# Patient Record
Sex: MALE | Race: BLACK OR AFRICAN AMERICAN | Employment: PART TIME | URBAN - METROPOLITAN AREA
[De-identification: names, ages, dates, MRNs, and addresses within clinical notes are randomized per-mention and may not be internally consistent; named-entity substitution may affect disease eponyms.]

---

## 2019-03-14 ENCOUNTER — HOSPITAL ENCOUNTER (EMERGENCY)
Age: 54
Discharge: HOME | End: 2019-03-14
Attending: EMERGENCY MEDICINE
Payer: SELF-PAY

## 2019-03-14 VITALS
HEIGHT: 69 IN | RESPIRATION RATE: 17 BRPM | OXYGEN SATURATION: 99 % | BODY MASS INDEX: 36.21 KG/M2 | TEMPERATURE: 98.3 F | WEIGHT: 244.5 LBS | DIASTOLIC BLOOD PRESSURE: 62 MMHG | SYSTOLIC BLOOD PRESSURE: 137 MMHG | HEART RATE: 80 BPM

## 2019-03-14 DIAGNOSIS — L72.0 RUPTURED EPIDERMAL CYST: Primary | ICD-10-CM

## 2019-03-14 PROCEDURE — 99283 EMERGENCY DEPT VISIT LOW MDM: CPT

## 2019-03-14 RX ORDER — CEPHALEXIN 500 MG/1
500 CAPSULE ORAL 4 TIMES DAILY
Qty: 40 CAP | Refills: 0 | Status: ON HOLD | OUTPATIENT
Start: 2019-03-14 | End: 2019-03-18 | Stop reason: CLARIF

## 2019-03-14 NOTE — ED PROVIDER NOTES
EMERGENCY DEPARTMENT HISTORY AND PHYSICAL EXAM    Date: 3/14/2019  Patient Name: Henry Larose    History of Presenting Illness     Chief Complaint   Patient presents with    Skin Problem     pt c/o boil on mid chest x 2 wks,its started draining today. History Provided By: Patient      HPI: Henry Larose is a 46 y.o. male with a PMH of seizure and cutaneous cyst who presents with mild nonpainful rash to chest wall 1 week with scant bleeding today. No modifying factors or medications. Patient specifically denies pain, purulent drainage, fever, chills, headache, nausea, vomiting, trouble breathing. PCP: None    Current Outpatient Medications   Medication Sig Dispense Refill    cephALEXin (KEFLEX) 500 mg capsule Take 1 Cap by mouth four (4) times daily for 10 days. 40 Cap 0    codeine-guaiFENesin (ROBITUSSIN-AC)  mg/5 mL syrup Take 10 mL by mouth four (4) times daily as needed for Cough. 180 mL 0       Past History     Past Medical History:  Past Medical History:   Diagnosis Date    Seizures ([address]     seizure as a child       Past Surgical History:  Past Surgical History:   Procedure Laterality Date    HX OTHER SURGICAL      cyst removed from right shoulder       Family History:  No family history on file. Social History:  Social History     Tobacco Use    Smoking status: Never Smoker   Substance Use Topics    Alcohol use: No    Drug use: No       Allergies:  No Known Allergies      Review of Systems   Review of Systems   Constitutional: Negative for activity change, chills, diaphoresis and fever. HENT: Negative for ear discharge, facial swelling, nosebleeds, postnasal drip, rhinorrhea, trouble swallowing and voice change. Eyes: Negative for photophobia, pain and visual disturbance. Respiratory: Negative for apnea, cough and shortness of breath. Cardiovascular: Negative for chest pain and palpitations.    Gastrointestinal: Negative for abdominal pain, diarrhea, nausea and vomiting. Genitourinary: Negative for decreased urine volume, difficulty urinating and hematuria. Musculoskeletal: Negative for arthralgias, back pain, gait problem, joint swelling, neck pain and neck stiffness. Skin: Positive for rash. Negative for color change, pallor and wound. Neurological: Negative for dizziness, facial asymmetry, speech difficulty, weakness, numbness and headaches. Psychiatric/Behavioral: Negative. Physical Exam     Vitals:    03/14/19 1003   BP: 137/62   Pulse: 80   Resp: 17   Temp: 98.3 °F (36.8 °C)   SpO2: 99%   Weight: 110.9 kg (244 lb 8 oz)   Height: 5' 9\" (1.753 m)     Physical Exam   Constitutional: He is oriented to person, place, and time. He appears well-developed and well-nourished. No distress. HENT:   Head: Normocephalic and atraumatic. Right Ear: Hearing and external ear normal.   Left Ear: Hearing and external ear normal.   Nose: Nose normal.   Eyes: Conjunctivae and EOM are normal. Pupils are equal, round, and reactive to light. Neck: Normal range of motion. Cardiovascular: Normal rate, regular rhythm, normal heart sounds and intact distal pulses. Pulmonary/Chest: Effort normal and breath sounds normal. No accessory muscle usage. No respiratory distress. He has no wheezes. He has no rales. He exhibits no tenderness. 2.5 cm diameter swollen nodule to mid chest wall with scant bloody drainage on manipulation. No purulent fluid. No fluctuance, induration, streaking. Musculoskeletal: Normal range of motion. Neurological: He is alert and oriented to person, place, and time. Skin: Skin is warm, dry and intact. He is not diaphoretic. No pallor. Psychiatric: He has a normal mood and affect. His speech is normal and behavior is normal. Judgment and thought content normal.   Nursing note and vitals reviewed. Diagnostic Study Results     Labs -   No results found for this or any previous visit (from the past 12 hour(s)).     Radiologic Studies -   No orders to display     CT Results  (Last 48 hours)    None        CXR Results  (Last 48 hours)    None            Medical Decision Making   I am the first provider for this patient. I reviewed the vital signs, available nursing notes, past medical history, past surgical history, family history and social history. Vital Signs-Reviewed the patient's vital signs. Records Reviewed: Nursing Notes and Old Medical Records            Disposition:    DISCHARGE NOTE:   .nwo      Care plan outlined and precautions discussed. Patient has no new complaints, changes, or physical findings. Results of exam were reviewed with the patient. All medications were reviewed with the patient; will d/c home with keflex. All of pt's questions and concerns were addressed. Patient was instructed and agrees to follow up with PCP, as well as to return to the ED upon further deterioration. Patient is ready to go home. Follow-up Information     Follow up With Specialties Details Why [facility] EMERGENCY DEPT Emergency Medicine Go in 2 days If symptoms worsen [address] [facility]          Current Discharge Medication List      START taking these medications    Details   cephALEXin (KEFLEX) 500 mg capsule Take 1 Cap by mouth four (4) times daily for 10 days. Qty: 40 Cap, Refills: 0             Provider Notes (Medical Decision Making):   Patient presents with a 3 cm nodule with scant bleeding today. No fluctuance, streaking, induration, purulent fluid. Plan to clean wound in the ED and discharged home on antibiotics. Differential of abscess, cellulitis, cyst    Procedures:  Procedures        Diagnosis     Clinical Impression:   1.  Ruptured epidermal cyst

## 2019-03-14 NOTE — ED NOTES
....Discharge summary and discharge medications reviewed with patient and appropriate educational materials and side effects teaching were provided. patient  Given 1 paper prescriptions and 0 electronic prescriptions sent to pt's listed pharmacy. Patient (s) verbalized understanding of the importance of discussing medications with his or her physician or clinic they will be following up with. No si/s of acute distress prior to discharge. Patient offered wheelchair from treatment area to hospital entrance, patient refused wheelchair. Denied pain. Gauze drsg with clear tape placed per provider request. Steady gait.

## 2019-03-14 NOTE — ED NOTES

## 2019-03-14 NOTE — DISCHARGE INSTRUCTIONS
Patient Education        Epidermoid Cyst: Care Instructions  Your Care Instructions  An epidermoid (say \"vd-gvo-DWH-bella\") cyst is a lump just under the skin. These cysts can form when a hair follicle becomes blocked. They are common in acne and may occur on the face, neck, back, and genitals. However, they can form anywhere on the body. These cysts are not cancer and do not lead to cancer. They tend not to hurt, but they can sometimes become swollen and painful. They also may break open (rupture) and cause scarring. These cysts sometimes do not cause problems and may not need treatment. If you have a cyst that is swollen and hurts, your doctor may inject it with a medicine to help it heal. But it is more likely that a painful cyst will need to be removed. Your doctor will give you a shot of numbing medicine and cut into the cyst to drain it or remove it. This makes the symptoms go away. Follow-up care is a key part of your treatment and safety. Be sure to make and go to all appointments, and call your doctor if you are having problems. It's also a good idea to know your test results and keep a list of the medicines you take. How can you care for yourself at home? · Do not squeeze the cyst or poke it with a needle to open it. This can cause swelling, redness, and infection. · Always have a doctor look at any new lumps you get to make sure that they are not serious. When should you call for help? Watch closely for changes in your health, and be sure to contact your doctor if:    · You have a fever, redness, or swelling after you get a shot of medicine in the cyst.     · You see or feel a new lump on your skin. Where can you learn more? Go to http://yung-tricia.info/. Enter T600 in the search box to learn more about \"Epidermoid Cyst: Care Instructions. \"  Current as of: April 17, 2018  Content Version: 11.9  © 3806-5859 [employer].  Care instructions adapted under license by [employer] (which disclaims liability or warranty for this information). If you have questions about a medical condition or this instruction, always ask your healthcare professional. [address] any warranty or liability for your use of this information.

## 2019-03-16 ENCOUNTER — HOSPITAL ENCOUNTER (INPATIENT)
Age: 54
LOS: 3 days | Discharge: HOME | DRG: 312 | End: 2019-03-19
Attending: EMERGENCY MEDICINE | Admitting: INTERNAL MEDICINE
Payer: SELF-PAY

## 2019-03-16 ENCOUNTER — APPOINTMENT (OUTPATIENT)
Dept: CT IMAGING | Age: 54
DRG: 312 | End: 2019-03-16
Attending: NURSE PRACTITIONER
Payer: SELF-PAY

## 2019-03-16 DIAGNOSIS — R55 SYNCOPE, UNSPECIFIED SYNCOPE TYPE: Primary | ICD-10-CM

## 2019-03-16 LAB
ALBUMIN SERPL-MCNC: 3.6 G/DL (ref 3.5–5)
ALBUMIN/GLOB SERPL: 0.9 {RATIO} (ref 1.1–2.2)
ALP SERPL-CCNC: 98 U/L (ref 45–117)
ALT SERPL-CCNC: 24 U/L (ref 12–78)
AMPHET UR QL SCN: NEGATIVE
ANION GAP SERPL CALC-SCNC: 5 MMOL/L (ref 5–15)
AST SERPL-CCNC: 30 U/L (ref 15–37)
BARBITURATES UR QL SCN: NEGATIVE
BASOPHILS # BLD: 0 K/UL (ref 0–0.1)
BASOPHILS NFR BLD: 0 % (ref 0–1)
BENZODIAZ UR QL: NEGATIVE
BILIRUB SERPL-MCNC: 0.3 MG/DL (ref 0.2–1)
BUN SERPL-MCNC: 9 MG/DL (ref 6–20)
BUN/CREAT SERPL: 8 (ref 12–20)
CALCIUM SERPL-MCNC: 8.8 MG/DL (ref 8.5–10.1)
CANNABINOIDS UR QL SCN: NEGATIVE
CHLORIDE SERPL-SCNC: 103 MMOL/L (ref 97–108)
CK MB CFR SERPL CALC: ABNORMAL % (ref 0–2.5)
CK MB SERPL-MCNC: <1 NG/ML (ref 5–25)
CK SERPL-CCNC: 512 U/L (ref 39–308)
CO2 SERPL-SCNC: 29 MMOL/L (ref 21–32)
COCAINE UR QL SCN: NEGATIVE
CREAT SERPL-MCNC: 1.08 MG/DL (ref 0.7–1.3)
D DIMER PPP FEU-MCNC: 0.46 MG/L FEU (ref 0–0.65)
DIFFERENTIAL METHOD BLD: ABNORMAL
DRUG SCRN COMMENT,DRGCM: NORMAL
EOSINOPHIL # BLD: 0.3 K/UL (ref 0–0.4)
EOSINOPHIL NFR BLD: 5 % (ref 0–7)
ERYTHROCYTE [DISTWIDTH] IN BLOOD BY AUTOMATED COUNT: 13.2 % (ref 11.5–14.5)
GLOBULIN SER CALC-MCNC: 3.9 G/DL (ref 2–4)
GLUCOSE SERPL-MCNC: 94 MG/DL (ref 65–100)
HCT VFR BLD AUTO: 39.7 % (ref 36.6–50.3)
HGB BLD-MCNC: 11.9 G/DL (ref 12.1–17)
IMM GRANULOCYTES # BLD AUTO: 0 K/UL (ref 0–0.04)
IMM GRANULOCYTES NFR BLD AUTO: 0 % (ref 0–0.5)
LYMPHOCYTES # BLD: 0.9 K/UL (ref 0.8–3.5)
LYMPHOCYTES NFR BLD: 15 % (ref 12–49)
MCH RBC QN AUTO: 26.6 PG (ref 26–34)
MCHC RBC AUTO-ENTMCNC: 30 G/DL (ref 30–36.5)
MCV RBC AUTO: 88.6 FL (ref 80–99)
METHADONE UR QL: NEGATIVE
MONOCYTES # BLD: 0.8 K/UL (ref 0–1)
MONOCYTES NFR BLD: 13 % (ref 5–13)
NEUTS SEG # BLD: 4.1 K/UL (ref 1.8–8)
NEUTS SEG NFR BLD: 67 % (ref 32–75)
NRBC # BLD: 0 K/UL (ref 0–0.01)
NRBC BLD-RTO: 0 PER 100 WBC
OPIATES UR QL: NEGATIVE
PCP UR QL: NEGATIVE
PLATELET # BLD AUTO: 215 K/UL (ref 150–400)
PMV BLD AUTO: 10 FL (ref 8.9–12.9)
POTASSIUM SERPL-SCNC: 4.9 MMOL/L (ref 3.5–5.1)
PROT SERPL-MCNC: 7.5 G/DL (ref 6.4–8.2)
RBC # BLD AUTO: 4.48 M/UL (ref 4.1–5.7)
SODIUM SERPL-SCNC: 137 MMOL/L (ref 136–145)
TROPONIN I SERPL-MCNC: <0.05 NG/ML
WBC # BLD AUTO: 6.2 K/UL (ref 4.1–11.1)

## 2019-03-16 PROCEDURE — 85379 FIBRIN DEGRADATION QUANT: CPT

## 2019-03-16 PROCEDURE — 93005 ELECTROCARDIOGRAM TRACING: CPT

## 2019-03-16 PROCEDURE — 99285 EMERGENCY DEPT VISIT HI MDM: CPT

## 2019-03-16 PROCEDURE — 36415 COLL VENOUS BLD VENIPUNCTURE: CPT

## 2019-03-16 PROCEDURE — 70450 CT HEAD/BRAIN W/O DYE: CPT

## 2019-03-16 PROCEDURE — 87040 BLOOD CULTURE FOR BACTERIA: CPT

## 2019-03-16 PROCEDURE — 74011250637 HC RX REV CODE- 250/637: Performed by: INTERNAL MEDICINE

## 2019-03-16 PROCEDURE — 85025 COMPLETE CBC W/AUTO DIFF WBC: CPT

## 2019-03-16 PROCEDURE — 74011250636 HC RX REV CODE- 250/636: Performed by: INTERNAL MEDICINE

## 2019-03-16 PROCEDURE — 82550 ASSAY OF CK (CPK): CPT

## 2019-03-16 PROCEDURE — 65270000032 HC RM SEMIPRIVATE

## 2019-03-16 PROCEDURE — 80307 DRUG TEST PRSMV CHEM ANLYZR: CPT

## 2019-03-16 PROCEDURE — 84484 ASSAY OF TROPONIN QUANT: CPT

## 2019-03-16 PROCEDURE — 80053 COMPREHEN METABOLIC PANEL: CPT

## 2019-03-16 RX ORDER — SODIUM CHLORIDE 0.9 % (FLUSH) 0.9 %
5-40 SYRINGE (ML) INJECTION EVERY 8 HOURS
Status: DISCONTINUED | OUTPATIENT
Start: 2019-03-16 | End: 2019-03-19 | Stop reason: HOSPADM

## 2019-03-16 RX ORDER — SODIUM CHLORIDE 9 MG/ML
150 INJECTION, SOLUTION INTRAVENOUS CONTINUOUS
Status: DISCONTINUED | OUTPATIENT
Start: 2019-03-17 | End: 2019-03-19

## 2019-03-16 RX ORDER — CEPHALEXIN 250 MG/1
500 CAPSULE ORAL 3 TIMES DAILY
Status: DISCONTINUED | OUTPATIENT
Start: 2019-03-17 | End: 2019-03-17

## 2019-03-16 RX ORDER — SODIUM CHLORIDE 0.9 % (FLUSH) 0.9 %
5-40 SYRINGE (ML) INJECTION AS NEEDED
Status: DISCONTINUED | OUTPATIENT
Start: 2019-03-16 | End: 2019-03-19 | Stop reason: HOSPADM

## 2019-03-16 RX ORDER — HEPARIN SODIUM 5000 [USP'U]/ML
5000 INJECTION, SOLUTION INTRAVENOUS; SUBCUTANEOUS EVERY 8 HOURS
Status: DISCONTINUED | OUTPATIENT
Start: 2019-03-16 | End: 2019-03-16

## 2019-03-16 RX ORDER — ACETAMINOPHEN 325 MG/1
650 TABLET ORAL
Status: DISCONTINUED | OUTPATIENT
Start: 2019-03-16 | End: 2019-03-19 | Stop reason: HOSPADM

## 2019-03-16 RX ADMIN — SODIUM CHLORIDE 100 ML/HR: 900 INJECTION, SOLUTION INTRAVENOUS at 23:40

## 2019-03-16 RX ADMIN — Medication 10 ML: at 23:40

## 2019-03-16 RX ADMIN — ACETAMINOPHEN 650 MG: 325 TABLET, FILM COATED ORAL at 23:39

## 2019-03-16 NOTE — ED NOTES
Emergency Department Nursing Plan of Care       The Nursing Plan of Care is developed from the Nursing assessment and Emergency Department Attending provider initial evaluation. The plan of care may be reviewed in the ED Provider note.     The Plan of Care was developed with the following considerations:   Patient / Family readiness to learn indicated by:verbalized understanding  Persons(s) to be included in education: patient  Barriers to Learning/Limitations:No    Signed     King Bird RN    3/16/2019   4:53 PM

## 2019-03-16 NOTE — ED NOTES
Bedside and Verbal shift change report given to MADS Plants RN (oncoming nurse) by Jose Enrique Long (offgoing nurse). Report included the following information SBAR, ED Summary and Recent Results.

## 2019-03-17 PROBLEM — R74.8 ELEVATED CK: Status: ACTIVE | Noted: 2019-03-17

## 2019-03-17 PROBLEM — L02.213 CHEST WALL ABSCESS: Status: ACTIVE | Noted: 2019-03-17

## 2019-03-17 PROBLEM — J09.X2 INFLUENZA A (H5N1): Status: ACTIVE | Noted: 2019-03-17

## 2019-03-17 LAB
ANION GAP SERPL CALC-SCNC: 8 MMOL/L (ref 5–15)
ATRIAL RATE: 76 BPM
BUN SERPL-MCNC: 8 MG/DL (ref 6–20)
BUN/CREAT SERPL: 7 (ref 12–20)
CALCIUM SERPL-MCNC: 8.4 MG/DL (ref 8.5–10.1)
CALCULATED P AXIS, ECG09: 62 DEGREES
CALCULATED R AXIS, ECG10: 45 DEGREES
CALCULATED T AXIS, ECG11: 49 DEGREES
CHLORIDE SERPL-SCNC: 105 MMOL/L (ref 97–108)
CO2 SERPL-SCNC: 27 MMOL/L (ref 21–32)
CREAT SERPL-MCNC: 1.09 MG/DL (ref 0.7–1.3)
DIAGNOSIS, 93000: NORMAL
ERYTHROCYTE [DISTWIDTH] IN BLOOD BY AUTOMATED COUNT: 13.1 % (ref 11.5–14.5)
FLUAV AG NPH QL IA: POSITIVE
FLUBV AG NOSE QL IA: NEGATIVE
GLUCOSE SERPL-MCNC: 92 MG/DL (ref 65–100)
HCT VFR BLD AUTO: 40.6 % (ref 36.6–50.3)
HGB BLD-MCNC: 12.5 G/DL (ref 12.1–17)
MCH RBC QN AUTO: 26.9 PG (ref 26–34)
MCHC RBC AUTO-ENTMCNC: 30.8 G/DL (ref 30–36.5)
MCV RBC AUTO: 87.3 FL (ref 80–99)
NRBC # BLD: 0 K/UL (ref 0–0.01)
NRBC BLD-RTO: 0 PER 100 WBC
P-R INTERVAL, ECG05: 196 MS
PLATELET # BLD AUTO: 177 K/UL (ref 150–400)
PMV BLD AUTO: 9.4 FL (ref 8.9–12.9)
POTASSIUM SERPL-SCNC: 3.9 MMOL/L (ref 3.5–5.1)
Q-T INTERVAL, ECG07: 370 MS
QRS DURATION, ECG06: 88 MS
QTC CALCULATION (BEZET), ECG08: 416 MS
RBC # BLD AUTO: 4.65 M/UL (ref 4.1–5.7)
SODIUM SERPL-SCNC: 140 MMOL/L (ref 136–145)
TROPONIN I SERPL-MCNC: <0.05 NG/ML
VENTRICULAR RATE, ECG03: 76 BPM
WBC # BLD AUTO: 4 K/UL (ref 4.1–11.1)

## 2019-03-17 PROCEDURE — 74011250636 HC RX REV CODE- 250/636: Performed by: INTERNAL MEDICINE

## 2019-03-17 PROCEDURE — 74011000258 HC RX REV CODE- 258: Performed by: FAMILY MEDICINE

## 2019-03-17 PROCEDURE — 87804 INFLUENZA ASSAY W/OPTIC: CPT

## 2019-03-17 PROCEDURE — 74011250637 HC RX REV CODE- 250/637: Performed by: INTERNAL MEDICINE

## 2019-03-17 PROCEDURE — 85027 COMPLETE CBC AUTOMATED: CPT

## 2019-03-17 PROCEDURE — 74011250637 HC RX REV CODE- 250/637: Performed by: FAMILY MEDICINE

## 2019-03-17 PROCEDURE — 80048 BASIC METABOLIC PNL TOTAL CA: CPT

## 2019-03-17 PROCEDURE — 84484 ASSAY OF TROPONIN QUANT: CPT

## 2019-03-17 PROCEDURE — 74011250636 HC RX REV CODE- 250/636: Performed by: FAMILY MEDICINE

## 2019-03-17 PROCEDURE — 65270000032 HC RM SEMIPRIVATE

## 2019-03-17 RX ORDER — OSELTAMIVIR PHOSPHATE 75 MG/1
75 CAPSULE ORAL 2 TIMES DAILY
Status: DISCONTINUED | OUTPATIENT
Start: 2019-03-17 | End: 2019-03-19 | Stop reason: HOSPADM

## 2019-03-17 RX ORDER — ENOXAPARIN SODIUM 100 MG/ML
40 INJECTION SUBCUTANEOUS EVERY 24 HOURS
Status: DISCONTINUED | OUTPATIENT
Start: 2019-03-17 | End: 2019-03-19 | Stop reason: HOSPADM

## 2019-03-17 RX ADMIN — OSELTAMIVIR PHOSPHATE 75 MG: 75 CAPSULE ORAL at 17:44

## 2019-03-17 RX ADMIN — Medication 10 ML: at 21:42

## 2019-03-17 RX ADMIN — ENOXAPARIN SODIUM 40 MG: 40 INJECTION, SOLUTION INTRAVENOUS; SUBCUTANEOUS at 12:26

## 2019-03-17 RX ADMIN — SODIUM CHLORIDE 100 ML/HR: 900 INJECTION, SOLUTION INTRAVENOUS at 23:54

## 2019-03-17 RX ADMIN — Medication 10 ML: at 13:23

## 2019-03-17 RX ADMIN — DOXYCYCLINE 100 MG: 100 INJECTION, POWDER, LYOPHILIZED, FOR SOLUTION INTRAVENOUS at 21:42

## 2019-03-17 RX ADMIN — Medication 10 ML: at 09:50

## 2019-03-17 RX ADMIN — SODIUM CHLORIDE 100 ML/HR: 900 INJECTION, SOLUTION INTRAVENOUS at 11:10

## 2019-03-17 RX ADMIN — OSELTAMIVIR PHOSPHATE 75 MG: 75 CAPSULE ORAL at 13:23

## 2019-03-17 RX ADMIN — CEPHALEXIN 500 MG: 250 CAPSULE ORAL at 09:50

## 2019-03-17 RX ADMIN — DOXYCYCLINE 100 MG: 100 INJECTION, POWDER, LYOPHILIZED, FOR SOLUTION INTRAVENOUS at 12:25

## 2019-03-17 NOTE — PROGRESS NOTES
0710 .. Bedside and Verbal shift change report given to Duarte Louise (oncoming nurse) by Nils Fernandez (offgoing nurse). Report included the following information SBAR, Kardex, Intake/Output, MAR, Recent Results and Med Rec Status. 1100 CBC, BMP, Trop lab drawn. Influenza A test was positive,MD was made aware. Droplet precaution was initiated. 1910 . [place] Bedside and Verbal shift change report given to [address] Wiggins) by [facility] nurse).  Report included the following information SBAR, Kardex, Intake/Output, MAR, Recent Results and Med Rec Status.

## 2019-03-17 NOTE — ED NOTES
TRANSFER - OUT REPORT:    Verbal report given to Chris RN(name) on Eunice Huff  being transferred to [facility](unit) for routine progression of care       Report consisted of patients Situation, Background, Assessment and   Recommendations(SBAR). Information from the following report(s) SBAR and ED Summary was reviewed with the receiving nurse. Lines:   Peripheral IV 03/16/19 Right Antecubital (Active)   Site Assessment Clean, dry, & intact 3/16/2019  6:30 PM   Phlebitis Assessment 0 3/16/2019  6:30 PM   Infiltration Assessment 0 3/16/2019  6:30 PM   Dressing Status Clean, dry, & intact 3/16/2019  6:30 PM   Dressing Type Tape;Transparent 3/16/2019  6:30 PM   Hub Color/Line Status Pink 3/16/2019  6:30 PM        Opportunity for questions and clarification was provided.       Patient transported with:   Monitor

## 2019-03-17 NOTE — ED NOTES
Assessment placed by this nurse. Pt. Arrived prior to nurses assessment . Initial assessment performed however not documented by initial receiving nurse.
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
## 2019-03-17 NOTE — PROGRESS NOTES
Bedside shift change report given to SHAYY Mendiola (oncoming nurse) by Xu Munoz (offgoing nurse). Report included the following information SBAR, Kardex, Intake/Output, MAR, Accordion and Cardiac Rhythm NSR.

## 2019-03-17 NOTE — PROGRESS NOTES
TRANSFER - IN REPORT:    Verbal report received from SHAYY Polanco(name) on Ella Haywood  being received from ED(unit) for routine progression of care      Report consisted of patients Situation, Background, Assessment and   Recommendations(SBAR). Information from the following report(s) SBAR, Kardex, ED Summary, Intake/Output, MAR, Accordion, Recent Results and Cardiac Rhythm NSR was reviewed with the receiving nurse. Opportunity for questions and clarification was provided.
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
## 2019-03-17 NOTE — PROGRESS NOTES
Problem: Falls - Risk of  Goal: *Absence of Falls  Document Paulo Fall Risk and appropriate interventions in the flowsheet.   Outcome: Progressing Towards Goal  Fall Risk Interventions:            Medication Interventions: Evaluate medications/consider consulting pharmacy, Patient to call before getting OOB         History of Falls Interventions: Evaluate medications/consider consulting pharmacy

## 2019-03-18 ENCOUNTER — APPOINTMENT (OUTPATIENT)
Dept: VASCULAR SURGERY | Age: 54
DRG: 312 | End: 2019-03-18
Attending: FAMILY MEDICINE
Payer: SELF-PAY

## 2019-03-18 ENCOUNTER — APPOINTMENT (OUTPATIENT)
Dept: NON INVASIVE DIAGNOSTICS | Age: 54
DRG: 312 | End: 2019-03-18
Attending: INTERNAL MEDICINE
Payer: SELF-PAY

## 2019-03-18 LAB
ANION GAP SERPL CALC-SCNC: 11 MMOL/L (ref 5–15)
BUN SERPL-MCNC: 8 MG/DL (ref 6–20)
BUN/CREAT SERPL: 7 (ref 12–20)
CALCIUM SERPL-MCNC: 7.9 MG/DL (ref 8.5–10.1)
CHLORIDE SERPL-SCNC: 104 MMOL/L (ref 97–108)
CK SERPL-CCNC: 585 U/L (ref 39–308)
CO2 SERPL-SCNC: 23 MMOL/L (ref 21–32)
CREAT SERPL-MCNC: 1.12 MG/DL (ref 0.7–1.3)
ERYTHROCYTE [DISTWIDTH] IN BLOOD BY AUTOMATED COUNT: 13.1 % (ref 11.5–14.5)
GLUCOSE SERPL-MCNC: 117 MG/DL (ref 65–100)
HCT VFR BLD AUTO: 38.2 % (ref 36.6–50.3)
HGB BLD-MCNC: 11.7 G/DL (ref 12.1–17)
LEFT CCA DIST DIAS: 20.7 CM/S
LEFT CCA DIST SYS: 76.6 CM/S
LEFT CCA PROX DIAS: 18.4 CM/S
LEFT CCA PROX SYS: 99.9 CM/S
LEFT ICA DIST DIAS: 34.3 CM/S
LEFT ICA DIST SYS: 81 CM/S
LEFT ICA MID DIAS: 27 CM/S
LEFT ICA MID SYS: 59.6 CM/S
LEFT ICA PROX DIAS: 32.4 CM/S
LEFT ICA PROX SYS: 88.2 CM/S
LEFT ICA/CCA SYS: 1.15
LEFT VERTEBRAL DIAS: 0 CM/S
LEFT VERTEBRAL SYS: 24.5 CM/S
MCH RBC QN AUTO: 26.7 PG (ref 26–34)
MCHC RBC AUTO-ENTMCNC: 30.6 G/DL (ref 30–36.5)
MCV RBC AUTO: 87.2 FL (ref 80–99)
NRBC # BLD: 0 K/UL (ref 0–0.01)
NRBC BLD-RTO: 0 PER 100 WBC
PLATELET # BLD AUTO: 169 K/UL (ref 150–400)
PMV BLD AUTO: 9.4 FL (ref 8.9–12.9)
POTASSIUM SERPL-SCNC: 4.2 MMOL/L (ref 3.5–5.1)
RBC # BLD AUTO: 4.38 M/UL (ref 4.1–5.7)
RIGHT CCA DIST DIAS: 23.5 CM/S
RIGHT CCA DIST SYS: 82.6 CM/S
RIGHT CCA PROX DIAS: 19.5 CM/S
RIGHT CCA PROX SYS: 102.3 CM/S
RIGHT ICA DIST DIAS: 35.3 CM/S
RIGHT ICA DIST SYS: 72.8 CM/S
RIGHT ICA MID DIAS: 31.4 CM/S
RIGHT ICA MID SYS: 76.7 CM/S
RIGHT ICA PROX DIAS: 27.4 CM/S
RIGHT ICA PROX SYS: 62.9 CM/S
RIGHT ICA/CCA SYS: 0.9
RIGHT VERTEBRAL DIAS: 18 CM/S
RIGHT VERTEBRAL SYS: 45.2 CM/S
SODIUM SERPL-SCNC: 138 MMOL/L (ref 136–145)
WBC # BLD AUTO: 3.6 K/UL (ref 4.1–11.1)

## 2019-03-18 PROCEDURE — 74011250637 HC RX REV CODE- 250/637: Performed by: FAMILY MEDICINE

## 2019-03-18 PROCEDURE — 93306 TTE W/DOPPLER COMPLETE: CPT

## 2019-03-18 PROCEDURE — 80048 BASIC METABOLIC PNL TOTAL CA: CPT

## 2019-03-18 PROCEDURE — 74011000258 HC RX REV CODE- 258: Performed by: FAMILY MEDICINE

## 2019-03-18 PROCEDURE — 65270000032 HC RM SEMIPRIVATE

## 2019-03-18 PROCEDURE — 85027 COMPLETE CBC AUTOMATED: CPT

## 2019-03-18 PROCEDURE — 82550 ASSAY OF CK (CPK): CPT

## 2019-03-18 PROCEDURE — 74011250636 HC RX REV CODE- 250/636: Performed by: FAMILY MEDICINE

## 2019-03-18 PROCEDURE — 93880 EXTRACRANIAL BILAT STUDY: CPT

## 2019-03-18 PROCEDURE — 36415 COLL VENOUS BLD VENIPUNCTURE: CPT

## 2019-03-18 PROCEDURE — 74011250636 HC RX REV CODE- 250/636: Performed by: INTERNAL MEDICINE

## 2019-03-18 RX ORDER — GUAIFENESIN 600 MG/1
600 TABLET, EXTENDED RELEASE ORAL EVERY 12 HOURS
Status: DISCONTINUED | OUTPATIENT
Start: 2019-03-18 | End: 2019-03-19 | Stop reason: HOSPADM

## 2019-03-18 RX ADMIN — Medication 10 ML: at 16:13

## 2019-03-18 RX ADMIN — DOXYCYCLINE 100 MG: 100 INJECTION, POWDER, LYOPHILIZED, FOR SOLUTION INTRAVENOUS at 21:51

## 2019-03-18 RX ADMIN — OSELTAMIVIR PHOSPHATE 75 MG: 75 CAPSULE ORAL at 08:44

## 2019-03-18 RX ADMIN — Medication 2 DROP: at 13:22

## 2019-03-18 RX ADMIN — GUAIFENESIN 600 MG: 600 TABLET, EXTENDED RELEASE ORAL at 10:09

## 2019-03-18 RX ADMIN — SODIUM CHLORIDE 150 ML/HR: 900 INJECTION, SOLUTION INTRAVENOUS at 21:51

## 2019-03-18 RX ADMIN — GUAIFENESIN 600 MG: 600 TABLET, EXTENDED RELEASE ORAL at 21:51

## 2019-03-18 RX ADMIN — Medication 10 ML: at 21:51

## 2019-03-18 RX ADMIN — Medication 2 DROP: at 10:09

## 2019-03-18 RX ADMIN — ENOXAPARIN SODIUM 40 MG: 40 INJECTION, SOLUTION INTRAVENOUS; SUBCUTANEOUS at 13:21

## 2019-03-18 RX ADMIN — OSELTAMIVIR PHOSPHATE 75 MG: 75 CAPSULE ORAL at 17:35

## 2019-03-18 RX ADMIN — SODIUM CHLORIDE 100 ML/HR: 900 INJECTION, SOLUTION INTRAVENOUS at 13:23

## 2019-03-18 RX ADMIN — Medication 10 ML: at 05:34

## 2019-03-18 RX ADMIN — Medication 2 DROP: at 16:15

## 2019-03-18 RX ADMIN — DOXYCYCLINE 100 MG: 100 INJECTION, POWDER, LYOPHILIZED, FOR SOLUTION INTRAVENOUS at 10:09
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
## 2019-03-18 NOTE — INTERDISCIPLINARY ROUNDS
1030) IDR with Dr. Julian West (MD), Clayton Reyna (pharmacist), Hossein Iverson (), Riya Stoo (wound care/infection control), Armani Soto (volunteer), and Qing Rivera (RN) to discuss plan of care including Echo and duplex this morning, continue antibiotics.

## 2019-03-18 NOTE — PROGRESS NOTES
Problem: Falls - Risk of  Goal: *Absence of Falls  Document Paulo Fall Risk and appropriate interventions in the flowsheet.   Outcome: Progressing Towards Goal  Fall Risk Interventions:            Medication Interventions: Evaluate medications/consider consulting pharmacy         History of Falls Interventions: Evaluate medications/consider consulting pharmacy

## 2019-03-18 NOTE — INTERDISCIPLINARY ROUNDS
Patient treatment plan discuss. Patient has Echo and duplex schedule today. Continue with antibiotics. Orthostatic negative. Positive for the flu.     [address] [facility]  [phone]

## 2019-03-18 NOTE — PROGRESS NOTES
Bedside shift change report given to [facility] SANTOS (oncoming nurse) by Jen Joseph (offgoing nurse). Report included the following information SBAR, Kardex, Intake/Output, MAR, Accordion and Cardiac Rhythm NSR.

## 2019-03-18 NOTE — PROGRESS NOTES
Progress Note      Patient: Gopal Pretty               Sex: male          DOA: 3/16/2019       YOB: 1965      Age:  46 y.o.        LOS:  LOS: 2 days               Subjective / Interval Hx  I    Gpoal Pretty is a 46 y.o. male  who presents with syncope and chest abscess spontaneously ruptured and draining . Patient had fever on admission. Influenza test shows he was + Influenza A and he was started on Tamiflu. Patient for syncope r/o ECHO and Duplex carotid are still pending. .      Objective:      Visit Vitals  BP 96/52 (BP 1 Location: Right arm, BP Patient Position: At rest)   Pulse 61   Temp 98.6 °F (37 °C)   Resp 18   Ht 5' 9\" (1.753 m)   Wt 109.8 kg (242 lb)   SpO2 96%   BMI 35.74 kg/m²             Physical Exam   Constitutional: He appears well-developed and well-nourished. No distress. HENT:   Head: Normocephalic and atraumatic. Eyes: Conjunctivae are normal.   Cardiovascular: Normal rate, normal heart sounds and intact distal pulses. No murmur heard. Pulmonary/Chest: Effort normal and breath sounds normal.   Abdominal: Soft. Bowel sounds are normal. He exhibits no distension. Musculoskeletal: He exhibits no edema. Neurological: He is alert. Skin: Skin is warm. No rash noted. He is not diaphoretic. No erythema. No pallor. Psychiatric: He has a normal mood and affect. Intake and Output:  Current Shift:  No intake/output data recorded. Last three shifts:  03/16 1901 - 03/18 0700  In: 1120 [P.O.:120;  I.V.:1000]  Out: 700 [Urine:700]    Recent Results (from the past 48 hour(s))   EKG, 12 LEAD, INITIAL    Collection Time: 03/16/19  4:43 PM   Result Value Ref Range    Ventricular Rate 76 BPM    Atrial Rate 76 BPM    P-R Interval 196 ms    QRS Duration 88 ms    Q-T Interval 370 ms    QTC Calculation (Bezet) 416 ms    Calculated P Axis 62 degrees    Calculated R Axis 45 degrees    Calculated T Axis 49 degrees    Diagnosis       Normal sinus rhythm  Normal ECG  When compared with ECG of 14-JAN-2013 22:32,  Nonspecific T wave abnormality, improved in Lateral leads  Confirmed by Madeline Gonzalez ([id number]) on 3/17/2019 10:07:11 PM     CBC WITH AUTOMATED DIFF    Collection Time: 03/16/19  4:44 PM   Result Value Ref Range    WBC 6.2 4.1 - 11.1 K/uL    RBC 4.48 4.10 - 5.70 M/uL    HGB 11.9 (L) 12.1 - 17.0 g/dL    HCT 39.7 36.6 - 50.3 %    MCV 88.6 80.0 - 99.0 FL    MCH 26.6 26.0 - 34.0 PG    MCHC 30.0 30.0 - 36.5 g/dL    RDW 13.2 11.5 - 14.5 %    PLATELET 045 859 - 286 K/uL    MPV 10.0 8.9 - 12.9 FL    NRBC 0.0 0  WBC    ABSOLUTE NRBC 0.00 0.00 - 0.01 K/uL    NEUTROPHILS 67 32 - 75 %    LYMPHOCYTES 15 12 - 49 %    MONOCYTES 13 5 - 13 %    EOSINOPHILS 5 0 - 7 %    BASOPHILS 0 0 - 1 %    IMMATURE GRANULOCYTES 0 0.0 - 0.5 %    ABS. NEUTROPHILS 4.1 1.8 - 8.0 K/UL    ABS. LYMPHOCYTES 0.9 0.8 - 3.5 K/UL    ABS. MONOCYTES 0.8 0.0 - 1.0 K/UL    ABS. EOSINOPHILS 0.3 0.0 - 0.4 K/UL    ABS. BASOPHILS 0.0 0.0 - 0.1 K/UL    ABS. IMM. GRANS. 0.0 0.00 - 0.04 K/UL    DF AUTOMATED     METABOLIC PANEL, COMPREHENSIVE    Collection Time: 03/16/19  4:44 PM   Result Value Ref Range    Sodium 137 136 - 145 mmol/L    Potassium 4.9 3.5 - 5.1 mmol/L    Chloride 103 97 - 108 mmol/L    CO2 29 21 - 32 mmol/L    Anion gap 5 5 - 15 mmol/L    Glucose 94 65 - 100 mg/dL    BUN 9 6 - 20 MG/DL    Creatinine 1.08 0.70 - 1.30 MG/DL    BUN/Creatinine ratio 8 (L) 12 - 20      GFR est AA >60 >60 ml/min/1.73m2    GFR est non-AA >60 >60 ml/min/1.73m2    Calcium 8.8 8.5 - 10.1 MG/DL    Bilirubin, total 0.3 0.2 - 1.0 MG/DL    ALT (SGPT) 24 12 - 78 U/L    AST (SGOT) 30 15 - 37 U/L    Alk.  phosphatase 98 45 - 117 U/L    Protein, total 7.5 6.4 - 8.2 g/dL    Albumin 3.6 3.5 - 5.0 g/dL    Globulin 3.9 2.0 - 4.0 g/dL    A-G Ratio 0.9 (L) 1.1 - 2.2     TROPONIN I    Collection Time: 03/16/19  4:45 PM   Result Value Ref Range    Troponin-I, Qt. <0.05 <0.05 ng/mL   CK W/ CKMB & INDEX    Collection Time: 03/16/19  4:45 PM Result Value Ref Range     (H) 39 - 308 U/L    CK - MB <1.0 <3.6 NG/ML    CK-MB Index Cannot be calculated 0.0 - 2.5     DRUG SCREEN, URINE    Collection Time: 03/16/19  5:02 PM   Result Value Ref Range    AMPHETAMINES NEGATIVE  NEG      BARBITURATES NEGATIVE  NEG      BENZODIAZEPINES NEGATIVE  NEG      COCAINE NEGATIVE  NEG      METHADONE NEGATIVE  NEG      OPIATES NEGATIVE  NEG      PCP(PHENCYCLIDINE) NEGATIVE  NEG      THC (TH-CANNABINOL) NEGATIVE  NEG      Drug screen comment (NOTE)    D DIMER    Collection Time: 03/16/19  7:30 PM   Result Value Ref Range    D-dimer 0.46 0.00 - 0.65 mg/L FEU   CULTURE, BLOOD    Collection Time: 03/16/19 11:47 PM   Result Value Ref Range    Special Requests: NO SPECIAL REQUESTS      Culture result: NO GROWTH AFTER 18 HOURS     INFLUENZA A & B AG (RAPID TEST)    Collection Time: 03/17/19 11:14 AM   Result Value Ref Range    Influenza A Antigen POSITIVE (A) NEG      Influenza B Antigen NEGATIVE  NEG     CBC W/O DIFF    Collection Time: 03/17/19 11:33 AM   Result Value Ref Range    WBC 4.0 (L) 4.1 - 11.1 K/uL    RBC 4.65 4.10 - 5.70 M/uL    HGB 12.5 12.1 - 17.0 g/dL    HCT 40.6 36.6 - 50.3 %    MCV 87.3 80.0 - 99.0 FL    MCH 26.9 26.0 - 34.0 PG    MCHC 30.8 30.0 - 36.5 g/dL    RDW 13.1 11.5 - 14.5 %    PLATELET 453 806 - 077 K/uL    MPV 9.4 8.9 - 12.9 FL    NRBC 0.0 0  WBC    ABSOLUTE NRBC 0.00 0.00 - 6.75 K/uL   METABOLIC PANEL, BASIC    Collection Time: 03/17/19 11:33 AM   Result Value Ref Range    Sodium 140 136 - 145 mmol/L    Potassium 3.9 3.5 - 5.1 mmol/L    Chloride 105 97 - 108 mmol/L    CO2 27 21 - 32 mmol/L    Anion gap 8 5 - 15 mmol/L    Glucose 92 65 - 100 mg/dL    BUN 8 6 - 20 MG/DL    Creatinine 1.09 0.70 - 1.30 MG/DL    BUN/Creatinine ratio 7 (L) 12 - 20      GFR est AA >60 >60 ml/min/1.73m2    GFR est non-AA >60 >60 ml/min/1.73m2    Calcium 8.4 (L) 8.5 - 10.1 MG/DL   TROPONIN I    Collection Time: 03/17/19 11:33 AM   Result Value Ref Range Troponin-I, Qt. <0.05 <0.05 ng/mL   CBC W/O DIFF    Collection Time: 03/18/19  1:33 AM   Result Value Ref Range    WBC 3.6 (L) 4.1 - 11.1 K/uL    RBC 4.38 4.10 - 5.70 M/uL    HGB 11.7 (L) 12.1 - 17.0 g/dL    HCT 38.2 36.6 - 50.3 %    MCV 87.2 80.0 - 99.0 FL    MCH 26.7 26.0 - 34.0 PG    MCHC 30.6 30.0 - 36.5 g/dL    RDW 13.1 11.5 - 14.5 %    PLATELET 193 643 - 861 K/uL    MPV 9.4 8.9 - 12.9 FL    NRBC 0.0 0  WBC    ABSOLUTE NRBC 0.00 0.00 - 2.30 K/uL   METABOLIC PANEL, BASIC    Collection Time: 03/18/19  1:33 AM   Result Value Ref Range    Sodium 138 136 - 145 mmol/L    Potassium 4.2 3.5 - 5.1 mmol/L    Chloride 104 97 - 108 mmol/L    CO2 23 21 - 32 mmol/L    Anion gap 11 5 - 15 mmol/L    Glucose 117 (H) 65 - 100 mg/dL    BUN 8 6 - 20 MG/DL    Creatinine 1.12 0.70 - 1.30 MG/DL    BUN/Creatinine ratio 7 (L) 12 - 20      GFR est AA >60 >60 ml/min/1.73m2    GFR est non-AA >60 >60 ml/min/1.73m2    Calcium 7.9 (L) 8.5 - 10.1 MG/DL   CK    Collection Time: 03/18/19  1:33 AM   Result Value Ref Range     (H) 39 - 308 U/L       Lab/Data Reviewed: All lab results for the last 24 hours reviewed. XRays were reviewed in past 24 hours    Medications Reviewed            Assessment/Plan     Active Problems:    Syncope (3/16/2019)      Chest wall abscess (3/17/2019)      Elevated CK (3/17/2019)      Influenza A (H5N1) (3/17/2019)             CARE PLAN:     Syncope   - had 2 episodes of syncope prior to ER visit   - Orthostatic vitals normal   - EKG NSR  - R/O seizure   - ECHO pending   - cardiac monitoring for arrythmias  - Duplex bl carotid YASMIN and LICA normal, left vertebral artery Doppler signal is resistant suggestive of distal occlusive disease.  Will recommend further f/u with vascular surgery   - Cardiology consulted.      Influenza A   - Tamiflu  75 mg bid x 5 days  - Droplet Precaution   - Continue  IVF        Fever   - probably 2/2 Chest wall abscess vs Influenza A  - On Keflex will switch to Doxycycline - Tylenol as needed   - blood cx NGTD     Chest Wall abscess   - spontaneously draining   - On Keflex I will switch him to Doxycycline 100 mg IV BID        Elevated CK  - IVF   - Trend CK      DVT prophylaxis - Lovenox     Full code      Nicole Epperson MD  March 18, 2019

## 2019-03-18 NOTE — PROGRESS NOTES
Pharmacy Medication Reconciliation     Recommendations/Findings:    Patient stated that he is not/does not take any medications   Removed the following medications from the patient's profile:  o Cephalexin  o Robitussin AC      -Clarified PTA med list with patient. PTA medication list was corrected to the following:     None          Thank you,  Cece Anderson, Pharm. D.

## 2019-03-18 NOTE — PROGRESS NOTES
0720) Bedside shift change report given to KitaRN (oncoming nurse) by Latonia Benavidez RN (offgoing nurse). Report included the following information SBAR, Kardex, MAR, Accordion and Recent Results. 0830) Pt complaint of nasal congestion and productive cough. Trace wheeze posterior upper lobes  0840) Spoke with ULT- plan for 1400  0845) Dr Fito Kidd at bedside. Order to cancel US of chest.  Order for mucinex and nasal drops  0850) pt downstairs for ECHO. [phone] 24Pierce Rachel, cardiology, report on patient from stress test.  Pt placed on 2L during the test but is now 95% on room air. Plan for Lajuanda Pavy before returning to room. 1910) Bedside shift change report given to [place] (oncoming nurse) by Yani Fniney (offgoing nurse). Report included the following information SBAR, Kardex, MAR, Accordion and Recent Results.

## 2019-03-18 NOTE — CDMP QUERY
Patient is noted to have a BMI of 35.74 kg/m2 ( 5'9\" 242 lbs ). If possible, please document in the progress notes and discharge summary if this patient is:     =>Morbidly obese   =>Obese  =>Overweight (please include evaluation / treatment / management provided)  =>Other explanation of clinical findings  =>Clinically Undetermined (no explanation for clinical findings)    REFERENCE:  The [address] [place] [address] has issued a statement indicating that, \"Individuals who are obese or morbidly obese are at an increased risk for certain medical conditions when compared to persons of normal weight. Therefore, these conditions are always clinically significant and reportable when documented by the provider. \"    Morbidly Obese:  BMI >/=40 or BMI >35 with obesity-related health condition   (e.g. Type 2 DM, HTN, DORETHA, GERD, depression, OA weight bearing joints, urinary stress incontinence, etc.)   Obese/Obesity:  BMI 30 - 39.9  Overweight:  BMI 25 - 29.9    Thank you,  Antonio Miller, JOSE, [place] [address]

## 2019-03-19 VITALS
TEMPERATURE: 98.2 F | WEIGHT: 242 LBS | OXYGEN SATURATION: 100 % | DIASTOLIC BLOOD PRESSURE: 85 MMHG | HEART RATE: 70 BPM | HEIGHT: 69 IN | RESPIRATION RATE: 18 BRPM | BODY MASS INDEX: 35.84 KG/M2 | SYSTOLIC BLOOD PRESSURE: 126 MMHG

## 2019-03-19 PROBLEM — E66.9 OBESITY (BMI 35.0-39.9 WITHOUT COMORBIDITY): Status: ACTIVE | Noted: 2019-03-19

## 2019-03-19 LAB
ANION GAP SERPL CALC-SCNC: 10 MMOL/L (ref 5–15)
BUN SERPL-MCNC: 9 MG/DL (ref 6–20)
BUN/CREAT SERPL: 9 (ref 12–20)
CALCIUM SERPL-MCNC: 8.1 MG/DL (ref 8.5–10.1)
CHLORIDE SERPL-SCNC: 107 MMOL/L (ref 97–108)
CK SERPL-CCNC: 390 U/L (ref 39–308)
CO2 SERPL-SCNC: 24 MMOL/L (ref 21–32)
CREAT SERPL-MCNC: 0.97 MG/DL (ref 0.7–1.3)
ECHO AO ROOT DIAM: 2.92 CM
ECHO AV AREA PLAN: 2.8 CM2
ECHO EST RA PRESSURE: 5 MMHG
ECHO LA AREA 4C: 13.7 CM2
ECHO LA MAJOR AXIS: 3.07 CM
ECHO LA TO AORTIC ROOT RATIO: 1.05
ECHO LA VOL 4C: 29.35 ML (ref 18–58)
ECHO LA VOLUME INDEX A4C: 13.1 ML/M2 (ref 16–28)
ECHO LV EDV A4C: 157.1 ML
ECHO LV EDV INDEX A4C: 70.1 ML/M2
ECHO LV EJECTION FRACTION A4C: 53 %
ECHO LV ESV A4C: 74.7 ML
ECHO LV ESV INDEX A4C: 33.3 ML/M2
ECHO LV INTERNAL DIMENSION DIASTOLIC: 4.9 CM (ref 4.2–5.9)
ECHO LV INTERNAL DIMENSION SYSTOLIC: 3.82 CM
ECHO LV IVSD: 0.65 CM (ref 0.6–1)
ECHO LV MASS 2D: 162.2 G (ref 88–224)
ECHO LV MASS INDEX 2D: 72.4 G/M2 (ref 49–115)
ECHO LV POSTERIOR WALL DIASTOLIC: 1.04 CM (ref 0.6–1)
ECHO LVOT DIAM: 2.02 CM
ECHO LVOT PEAK GRADIENT: 3.7 MMHG
ECHO LVOT PEAK VELOCITY: 96 CM/S
ECHO MV A VELOCITY: 41.31 CM/S
ECHO MV AREA PHT: 2.2 CM2
ECHO MV AREA PLAN: 7.7 CM2
ECHO MV E DECELERATION TIME (DT): 95.1 MS
ECHO MV E VELOCITY: 45.99 CM/S
ECHO MV E/A RATIO: 1.11
ECHO MV MAX VELOCITY: 76.09 CM/S
ECHO MV MEAN GRADIENT: 0.8 MMHG
ECHO MV PEAK GRADIENT: 2.3 MMHG
ECHO MV PRESSURE HALF TIME (PHT): 99.9 MS
ECHO MV VTI: 23.09 CM
ECHO PV MAX VELOCITY: 75.91 CM/S
ECHO PV PEAK GRADIENT: 2.3 MMHG
ECHO PV REGURGITANT MAX VELOCITY: 79.28 CM/S
ECHO RA AREA 4C: 13.62 CM2
ERYTHROCYTE [DISTWIDTH] IN BLOOD BY AUTOMATED COUNT: 13 % (ref 11.5–14.5)
GLUCOSE SERPL-MCNC: 92 MG/DL (ref 65–100)
HCT VFR BLD AUTO: 38.5 % (ref 36.6–50.3)
HGB BLD-MCNC: 11.6 G/DL (ref 12.1–17)
MCH RBC QN AUTO: 26.1 PG (ref 26–34)
MCHC RBC AUTO-ENTMCNC: 30.1 G/DL (ref 30–36.5)
MCV RBC AUTO: 86.7 FL (ref 80–99)
NRBC # BLD: 0 K/UL (ref 0–0.01)
NRBC BLD-RTO: 0 PER 100 WBC
PLATELET # BLD AUTO: 193 K/UL (ref 150–400)
PMV BLD AUTO: 9.5 FL (ref 8.9–12.9)
POTASSIUM SERPL-SCNC: 3.9 MMOL/L (ref 3.5–5.1)
RBC # BLD AUTO: 4.44 M/UL (ref 4.1–5.7)
SODIUM SERPL-SCNC: 141 MMOL/L (ref 136–145)
TSH SERPL DL<=0.05 MIU/L-ACNC: 1.23 UIU/ML (ref 0.36–3.74)
WBC # BLD AUTO: 4 K/UL (ref 4.1–11.1)

## 2019-03-19 PROCEDURE — 82550 ASSAY OF CK (CPK): CPT

## 2019-03-19 PROCEDURE — 74011250636 HC RX REV CODE- 250/636: Performed by: FAMILY MEDICINE

## 2019-03-19 PROCEDURE — 74011000258 HC RX REV CODE- 258: Performed by: FAMILY MEDICINE

## 2019-03-19 PROCEDURE — 80048 BASIC METABOLIC PNL TOTAL CA: CPT

## 2019-03-19 PROCEDURE — 84443 ASSAY THYROID STIM HORMONE: CPT

## 2019-03-19 PROCEDURE — 36415 COLL VENOUS BLD VENIPUNCTURE: CPT

## 2019-03-19 PROCEDURE — 85027 COMPLETE CBC AUTOMATED: CPT

## 2019-03-19 PROCEDURE — 74011250637 HC RX REV CODE- 250/637: Performed by: FAMILY MEDICINE

## 2019-03-19 RX ORDER — OSELTAMIVIR PHOSPHATE 75 MG/1
75 CAPSULE ORAL 2 TIMES DAILY
Qty: 5 CAP | Refills: 0 | Status: SHIPPED | OUTPATIENT
Start: 2019-03-19 | End: 2019-03-22

## 2019-03-19 RX ORDER — DOXYCYCLINE HYCLATE 100 MG
100 TABLET ORAL EVERY 12 HOURS
Status: DISCONTINUED | OUTPATIENT
Start: 2019-03-19 | End: 2019-03-19 | Stop reason: HOSPADM

## 2019-03-19 RX ORDER — DOXYCYCLINE HYCLATE 100 MG
100 TABLET ORAL EVERY 12 HOURS
Qty: 15 TAB | Refills: 0 | Status: SHIPPED | OUTPATIENT
Start: 2019-03-19 | End: 2019-03-27

## 2019-03-19 RX ORDER — OSELTAMIVIR PHOSPHATE 75 MG/1
75 CAPSULE ORAL 2 TIMES DAILY
Qty: 5 CAP | Refills: 0 | Status: SHIPPED
Start: 2019-03-19 | End: 2019-03-19

## 2019-03-19 RX ORDER — DOXYCYCLINE HYCLATE 100 MG
100 TABLET ORAL EVERY 12 HOURS
Qty: 15 TAB | Refills: 0 | Status: SHIPPED
Start: 2019-03-19 | End: 2019-03-19

## 2019-03-19 RX ADMIN — GUAIFENESIN 600 MG: 600 TABLET, EXTENDED RELEASE ORAL at 09:18

## 2019-03-19 RX ADMIN — SODIUM CHLORIDE 150 ML/HR: 900 INJECTION, SOLUTION INTRAVENOUS at 04:52

## 2019-03-19 RX ADMIN — OSELTAMIVIR PHOSPHATE 75 MG: 75 CAPSULE ORAL at 09:18

## 2019-03-19 RX ADMIN — DOXYCYCLINE 100 MG: 100 INJECTION, POWDER, LYOPHILIZED, FOR SOLUTION INTRAVENOUS at 09:18
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
## 2019-03-19 NOTE — PROGRESS NOTES
Spiritual Care Partner Volunteer visited patient in Med Surg at [facility] on 3/19/19. Documented by: 
Enrique Galloway

## 2019-03-19 NOTE — PROGRESS NOTES
[address] [facility] March 19, 2019 RE: Thersia Rakers To Whom It May Concern, This is to certify that Thersia Rakers may may return to work on 3/25/19. Please feel free to contact my office if you have any questions or concerns. Thank you for your assistance in this matter.  
 
 
Sincerely, 
Antonia Benson MD 
 
 
 
 
 
 29-Aug-2017

## 2019-03-19 NOTE — DISCHARGE INSTRUCTIONS
Patient Education        Skin Abscess: Care Instructions  Your Care Instructions    A skin abscess is a bacterial infection that forms a pocket of pus. A boil is a kind of skin abscess. The doctor may have cut an opening in the abscess so that the pus can drain out. You may have gauze in the cut so that the abscess will stay open and keep draining. You may need antibiotics. You will need to follow up with your doctor to make sure the infection has gone away. The doctor has checked you carefully, but problems can develop later. If you notice any problems or new symptoms, get medical treatment right away. Follow-up care is a key part of your treatment and safety. Be sure to make and go to all appointments, and call your doctor if you are having problems. It's also a good idea to know your test results and keep a list of the medicines you take. How can you care for yourself at home? · Apply warm and dry compresses, a heating pad set on low, or a hot water bottle 3 or 4 times a day for pain. Keep a cloth between the heat source and your skin. · If your doctor prescribed antibiotics, take them as directed. Do not stop taking them just because you feel better. You need to take the full course of antibiotics. · Take pain medicines exactly as directed. ? If the doctor gave you a prescription medicine for pain, take it as prescribed. ? If you are not taking a prescription pain medicine, ask your doctor if you can take an over-the-counter medicine. · Keep your bandage clean and dry. Change the bandage whenever it gets wet or dirty, or at least one time a day. · If the abscess was packed with gauze:  ? Keep follow-up appointments to have the gauze changed or removed. If the doctor instructed you to remove the gauze, follow the instructions you were given for how to remove it. ? After the gauze is removed, soak the area in warm water for 15 to 20 minutes 2 times a day, until the wound closes.   When should you call for help? Call your doctor now or seek immediate medical care if:    · You have signs of worsening infection, such as:  ? Increased pain, swelling, warmth, or redness. ? Red streaks leading from the infected skin. ? Pus draining from the wound. ? A fever.    Watch closely for changes in your health, and be sure to contact your doctor if:    · You do not get better as expected. Where can you learn more? Go to http://yung-tricia.info/. Enter Z333 in the search box to learn more about \"Skin Abscess: Care Instructions. \"  Current as of: April 17, 2018  Content Version: 11.9  © 4057-4989 [employer]. Care instructions adapted under license by [employer] (which disclaims liability or warranty for this information). If you have questions about a medical condition or this instruction, always ask your healthcare professional. [place] [address] any warranty or liability for your use of this information. Patient Education        Influenza (Flu): Care Instructions  Your Care Instructions    Influenza (flu) is an infection in the lungs and breathing passages. It is caused by the influenza virus. There are different strains, or types, of the flu virus from year to year. Unlike the common cold, the flu comes on suddenly and the symptoms, such as a cough, congestion, fever, chills, fatigue, aches, and pains, are more severe. These symptoms may last up to 10 days. Although the flu can make you feel very sick, it usually doesn't cause serious health problems. Home treatment is usually all you need for flu symptoms. But your doctor may prescribe antiviral medicine to prevent other health problems, such as pneumonia, from developing. Older people and those who have a long-term health condition, such as lung disease, are most at risk for having pneumonia or other health problems. Follow-up care is a key part of your treatment and safety.  Be sure to make and go to all appointments, and call your doctor if you are having problems. It's also a good idea to know your test results and keep a list of the medicines you take. How can you care for yourself at home? · Get plenty of rest.  · Drink plenty of fluids, enough so that your urine is light yellow or clear like water. If you have kidney, heart, or liver disease and have to limit fluids, talk with your doctor before you increase the amount of fluids you drink. · Take an over-the-counter pain medicine if needed, such as acetaminophen (Tylenol), ibuprofen (Advil, Motrin), or naproxen (Aleve), to relieve fever, headache, and muscle aches. Read and follow all instructions on the label. No one younger than 20 should take aspirin. It has been linked to Reye syndrome, a serious illness. · Do not smoke. Smoking can make the flu worse. If you need help quitting, talk to your doctor about stop-smoking programs and medicines. These can increase your chances of quitting for good. · Breathe moist air from a hot shower or from a sink filled with hot water to help clear a stuffy nose. · Before you use cough and cold medicines, check the label. These medicines may not be safe for young children or for people with certain health problems. · If the skin around your nose and lips becomes sore, put some petroleum jelly on the area. · To ease coughing:  ? Drink fluids to soothe a scratchy throat. ? Suck on cough drops or plain hard candy. ? Take an over-the-counter cough medicine that contains dextromethorphan to help you get some sleep. Read and follow all instructions on the label. ? Raise your head at night with an extra pillow. This may help you rest if coughing keeps you awake. · Take any prescribed medicine exactly as directed. Call your doctor if you think you are having a problem with your medicine. To avoid spreading the flu  · Wash your hands regularly, and keep your hands away from your face.   · Stay home from school, work, and other public places until you are feeling better and your fever has been gone for at least 24 hours. The fever needs to have gone away on its own without the help of medicine. · Ask people living with you to talk to their doctors about preventing the flu. They may get antiviral medicine to keep from getting the flu from you. · To prevent the flu in the future, get a flu vaccine every fall. Encourage people living with you to get the vaccine. · Cover your mouth when you cough or sneeze. When should you call for help? Call 911 anytime you think you may need emergency care. For example, call if:    · You have severe trouble breathing.    Call your doctor now or seek immediate medical care if:    · You have new or worse trouble breathing.     · You seem to be getting much sicker.     · You feel very sleepy or confused.     · You have a new or higher fever.     · You get a new rash.    Watch closely for changes in your health, and be sure to contact your doctor if:    · You begin to get better and then get worse.     · You are not getting better after 1 week. Where can you learn more? Go to http://yung-tricia.info/. Enter T287 in the search box to learn more about \"Influenza (Flu): Care Instructions. \"  Current as of: September 5, 2018  Content Version: 11.9  © 9698-2039 [employer]. Care instructions adapted under license by [employer] (which disclaims liability or warranty for this information). If you have questions about a medical condition or this instruction, always ask your healthcare professional. [place] [address] any warranty or liability for your use of this information.
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
## 2019-03-20 ENCOUNTER — TELEPHONE (OUTPATIENT)
Dept: CASE MANAGEMENT | Age: 54
End: 2019-03-20

## 2019-03-20 NOTE — TELEPHONE ENCOUNTER
Case Management Follow-up call  CM reviewed chart. CM called pt to discuss discharge plan. Pt did not answer the phone.  is not set up to leave a message at this time. Pt will need to be reminded of his appointments in his AVS along with the notes next to his appointments.        MARLIN WHEATLEY

## 2019-03-22 LAB
BACTERIA SPEC CULT: NORMAL
SERVICE CMNT-IMP: NORMAL

## 2019-04-10 ENCOUNTER — HOSPITAL ENCOUNTER (OUTPATIENT)
Dept: CT IMAGING | Age: 54
Discharge: HOME | End: 2019-04-10
Attending: SURGERY
Payer: SELF-PAY

## 2019-04-10 DIAGNOSIS — R09.89 SYMPTOMS INVOLVING CARDIOVASCULAR SYSTEM: ICD-10-CM

## 2019-04-10 DIAGNOSIS — R55 SYNCOPE AND COLLAPSE: ICD-10-CM

## 2019-04-10 PROCEDURE — 74011000258 HC RX REV CODE- 258: Performed by: RADIOLOGY

## 2019-04-10 PROCEDURE — 70498 CT ANGIOGRAPHY NECK: CPT

## 2019-04-10 PROCEDURE — 74011636320 HC RX REV CODE- 636/320: Performed by: RADIOLOGY

## 2019-04-10 RX ORDER — SODIUM CHLORIDE 0.9 % (FLUSH) 0.9 %
10 SYRINGE (ML) INJECTION
Status: COMPLETED | OUTPATIENT
Start: 2019-04-10 | End: 2019-04-10

## 2019-04-10 RX ADMIN — Medication 10 ML: at 08:16

## 2019-04-10 RX ADMIN — SODIUM CHLORIDE 100 ML: 900 INJECTION, SOLUTION INTRAVENOUS at 08:16

## 2019-04-10 RX ADMIN — IOPAMIDOL 100 ML: 755 INJECTION, SOLUTION INTRAVENOUS at 08:16

## 2019-04-24 ENCOUNTER — OFFICE VISIT (OUTPATIENT)
Dept: INTERNAL MEDICINE CLINIC | Age: 54
End: 2019-04-24

## 2019-04-24 VITALS
HEIGHT: 66 IN | OXYGEN SATURATION: 97 % | BODY MASS INDEX: 38.09 KG/M2 | DIASTOLIC BLOOD PRESSURE: 77 MMHG | TEMPERATURE: 98 F | SYSTOLIC BLOOD PRESSURE: 126 MMHG | HEART RATE: 60 BPM | RESPIRATION RATE: 19 BRPM | WEIGHT: 237 LBS

## 2019-04-24 DIAGNOSIS — Z00.00 ENCOUNTER FOR MEDICAL EXAMINATION TO ESTABLISH CARE: Primary | ICD-10-CM

## 2019-04-24 DIAGNOSIS — E66.9 OBESITY (BMI 35.0-39.9 WITHOUT COMORBIDITY): ICD-10-CM

## 2019-04-24 DIAGNOSIS — G45.0 VERTEBRAL-BASILAR ARTERY OCCLUSIVE SYNDROME: ICD-10-CM

## 2019-04-24 DIAGNOSIS — Z87.898 HISTORY OF SEIZURES: ICD-10-CM

## 2019-04-24 DIAGNOSIS — E01.0 THYROMEGALY: ICD-10-CM

## 2019-04-24 DIAGNOSIS — R55 SYNCOPE, UNSPECIFIED SYNCOPE TYPE: ICD-10-CM

## 2019-04-24 NOTE — PROGRESS NOTES
Chief Complaint Patient presents with [facility] Establish Care 1. Have you been to the ER, urgent care clinic since your last visit? Hospitalized since your last visit? No 
 
2. Have you seen or consulted any other health care providers outside of the [address] [place] [address] since your last visit? Include any pap smears or colon screening.  No
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
## 2019-04-24 NOTE — PATIENT INSTRUCTIONS
Eating Healthy Foods: Care Instructions Your Care Instructions Eating healthy foods can help lower your risk for disease. Healthy food gives you energy and keeps your heart strong, your brain active, your muscles working, and your bones strong. A healthy diet includes a variety of foods from the basic food groups: grains, vegetables, fruits, milk and milk products, and meat and beans. Some people may eat more of their favorite foods from only one food group and, as a result, miss getting the nutrients they need. So, it is important to pay attention not only to what you eat but also to what you are missing from your diet. You can eat a healthy, balanced diet by making a few small changes. Follow-up care is a key part of your treatment and safety. Be sure to make and go to all appointments, and call your doctor if you are having problems. It's also a good idea to know your test results and keep a list of the medicines you take. How can you care for yourself at home? Look at what you eat · Keep a food diary for a week or two and record everything you eat or drink. Track the number of servings you eat from each food group. · For a balanced diet every day, eat a variety of: 
? 6 or more ounce-equivalents of grains, such as cereals, breads, crackers, rice, or pasta, every day. An ounce-equivalent is 1 slice of bread, 1 cup of ready-to-eat cereal, or ½ cup of cooked rice, cooked pasta, or cooked cereal. 
? 2½ cups of vegetables, especially: § Dark-green vegetables such as broccoli and spinach. § Orange vegetables such as carrots and sweet potatoes. § Dry beans (such as soria and kidney beans) and peas (such as lentils). ? 2 cups of fresh, frozen, or canned fruit. A small apple or 1 banana or orange equals 1 cup. ? 3 cups of nonfat or low-fat milk, yogurt, or other milk products.  
? 5½ ounces of meat and beans, such as chicken, fish, lean meat, beans, nuts, and seeds. One egg, 1 tablespoon of peanut butter, ½ ounce nuts or seeds, or ¼ cup of cooked beans equals 1 ounce of meat. · Learn how to read food labels for serving sizes and ingredients. Fast-food and convenience-food meals often contain few or no fruits or vegetables. Make sure you eat some fruits and vegetables to make the meal more nutritious. · Look at your food diary. For each food group, add up what you have eaten and then divide the total by the number of days. This will give you an idea of how much you are eating from each food group. See if you can find some ways to change your diet to make it more healthy. Start small · Do not try to make dramatic changes to your diet all at once. You might feel that you are missing out on your favorite foods and then be more likely to fail. · Start slowly, and gradually change your habits. Try some of the following: ? Use whole wheat bread instead of white bread. ? Use nonfat or low-fat milk instead of whole milk. ? Eat brown rice instead of white rice, and eat whole wheat pasta instead of white-flour pasta. ? Try low-fat cheeses and low-fat yogurt. ? Add more fruits and vegetables to meals and have them for snacks. ? Add lettuce, tomato, cucumber, and onion to sandwiches. ? Add fruit to yogurt and cereal. 
Enjoy food · You can still eat your favorite foods. You just may need to eat less of them. If your favorite foods are high in fat, salt, and sugar, limit how often you eat them, but do not cut them out entirely. · Eat a wide variety of foods. Make healthy choices when eating out · The type of restaurant you choose can help you make healthy choices. Even fast-food chains are now offering more low-fat or healthier choices on the menu. · Choose smaller portions, or take half of your meal home. · When eating out, try: ? A veggie pizza with a whole wheat crust or grilled chicken (instead of sausage or pepperoni). ? Pasta with roasted vegetables, grilled chicken, or marinara sauce instead of cream sauce. ? A vegetable wrap or grilled chicken wrap. ? Broiled or poached food instead of fried or breaded items. Make healthy choices easy · Buy packaged, prewashed, ready-to-eat fresh vegetables and fruits, such as baby carrots, salad mixes, and chopped or shredded broccoli and cauliflower. · Buy packaged, presliced fruits, such as melon or pineapple. · Choose 100% fruit or vegetable juice instead of soda. Limit juice intake to 4 to 6 oz (½ to ¾ cup) a day. · Blend low-fat yogurt, fruit juice, and canned or frozen fruit to make a smoothie for breakfast or a snack. Where can you learn more? Go to http://yung-tricia.info/. Enter T756 in the search box to learn more about \"Eating Healthy Foods: Care Instructions. \" Current as of: March 28, 2018 Content Version: 11.9 © 3694-7513 [employer]. Care instructions adapted under license by [employer] (which disclaims liability or warranty for this information). If you have questions about a medical condition or this instruction, always ask your healthcare professional. [address] any warranty or liability for your use of this information.

## 2020-06-01 ENCOUNTER — VIRTUAL VISIT (OUTPATIENT)
Dept: INTERNAL MEDICINE CLINIC | Age: 55
End: 2020-06-01

## 2020-06-01 VITALS — HEIGHT: 66 IN | BODY MASS INDEX: 38.09 KG/M2 | WEIGHT: 237 LBS

## 2020-06-01 NOTE — PROGRESS NOTES
Chief Complaint   Patient presents with    Thyroid Problem     1. Have you been to the ER, urgent care clinic since your last visit? Hospitalized since your last visit? No    2. Have you seen or consulted any other health care providers outside of the [address] [place] [address] since your last visit? Include any pap smears or colon screening.  No

## 2020-07-14 ENCOUNTER — OFFICE VISIT (OUTPATIENT)
Dept: INTERNAL MEDICINE CLINIC | Age: 55
End: 2020-07-14

## 2020-07-14 VITALS
HEIGHT: 66 IN | OXYGEN SATURATION: 100 % | SYSTOLIC BLOOD PRESSURE: 138 MMHG | DIASTOLIC BLOOD PRESSURE: 73 MMHG | TEMPERATURE: 99 F | BODY MASS INDEX: 38.09 KG/M2 | RESPIRATION RATE: 19 BRPM | HEART RATE: 91 BPM | WEIGHT: 237 LBS

## 2020-07-14 DIAGNOSIS — Z00.00 WELL ADULT EXAM: Primary | ICD-10-CM

## 2020-07-14 DIAGNOSIS — E66.9 OBESITY (BMI 35.0-39.9 WITHOUT COMORBIDITY): ICD-10-CM

## 2020-07-14 DIAGNOSIS — Z12.11 COLON CANCER SCREENING: ICD-10-CM

## 2020-07-14 DIAGNOSIS — E01.0 THYROMEGALY: ICD-10-CM

## 2020-07-14 DIAGNOSIS — G45.0 VERTEBRAL-BASILAR ARTERY OCCLUSIVE SYNDROME: ICD-10-CM

## 2020-07-14 NOTE — PROGRESS NOTES
Chief Complaint   Patient presents with    Complete Physical     1. Have you been to the ER, urgent care clinic since your last visit? Hospitalized since your last visit? No    2. Have you seen or consulted any other health care providers outside of the [address] [place] [address] since your last visit? Include any pap smears or colon screening.  No

## 2020-07-14 NOTE — PROGRESS NOTES
Dora Manning is a 53 y.o. male and presents with Complete Physical  .  Subjective: For PE    PMH-H/o childhood seizures   H/o syncope-was informed last year he needs a stent in his heart, but he had no insurance. Pt requests referral to f/u w that provider, but does not have the info. No consult in our system. I will refer him back to the vascular specialist he saw after hospitalization last year   ?enlarged left thyroid on carotid duplex last year    [address] \"cyst\" removal    SH-   Works in dietary   + sex active + condoms all the time last std check ~ 2 yrs ago    Lives with friend   No tobacco, occas alcohol, no illicit drug use       FH-father  ~70 ? ? DM complications   Mother  ~87 DM complication   2 siblings healthy        Review of Systems  Constitutional: negative for fevers, chills, anorexia and weight loss  Eyes:   negative for visual disturbance and irritation  ENT:   negative for tinnitus,sore throat,nasal congestion,ear pains. hoarseness  Respiratory:  negative for cough, hemoptysis, dyspnea,wheezing  CV:   negative for chest pain, palpitations, lower extremity edema  GI:   negative for nausea, vomiting, diarrhea, abdominal pain,melena  Musculoskel: negative for myalgias, arthralgias, back pain, muscle weakness, joint pain  Neurological:  negative for headaches, dizziness, vertigo, memory problems and gait   Behavl/Psych: negative for feelings of anxiety, depression, mood changes    Past Medical History:   Diagnosis Date    Seizures ([facility] [address]     seizure as a child     Past Surgical History:   Procedure Laterality Date    HX OTHER SURGICAL      cyst removed from right shoulder     Social History     Socioeconomic History    Marital status: LEGALLY      Spouse name: Not on file    Number of children: Not on file    Years of education: Not on file    Highest education level: Not on file   Tobacco Use    Smoking status: Never Smoker    Smokeless tobacco: Never Used Substance and Sexual Activity    Alcohol use: No     Comment: socially    Drug use: No    Sexual activity: Yes     Partners: Female     Birth control/protection: None     History reviewed. No pertinent family history. Allergies   Allergen Reactions    Egg Derived Nausea and Vomiting       Objective:  Visit Vitals  /73 (BP 1 Location: Left arm, BP Patient Position: Sitting)   Pulse 91   Temp 99 °F (37.2 °C) (Oral)   Resp 19   Ht 5' 6\" (1.676 m)   Wt 237 lb (107.5 kg)   SpO2 100%   BMI 38.25 kg/m²     Physical Exam:   General appearance - alert,obese pleasant  Mental status - alert, oriented to person, place, and time  EYE-EOMI  Mouth - crowded pharynx (pt denies signif snoring)  Neck - supple, no significant adenopathy  + palp left thyroid>right  Chest - clear to auscultation  Heart - normal rate, regular rhythm, normal S1, S2  Abdomen - soft, nontender, nobese  Ext-peripheral pulses normal, no pedal edema, no clubbing or cyanosis  Skin-Warm and dry.  no hyperpigmentation, vitiligo, or suspicious lesions  Neuro -alert, oriented, normal speech, no focal findings or movement disorder noted      Results for orders placed or performed during the hospital encounter of 03/16/19   CULTURE, BLOOD    Specimen: Blood   Result Value Ref Range    Special Requests: NO SPECIAL REQUESTS      Culture result: NO GROWTH 5 DAYS     INFLUENZA A & B AG (RAPID TEST)   Result Value Ref Range    Influenza A Antigen POSITIVE (A) NEG      Influenza B Antigen NEGATIVE  NEG     CBC WITH AUTOMATED DIFF   Result Value Ref Range    WBC 6.2 4.1 - 11.1 K/uL    RBC 4.48 4.10 - 5.70 M/uL    HGB 11.9 (L) 12.1 - 17.0 g/dL    HCT 39.7 36.6 - 50.3 %    MCV 88.6 80.0 - 99.0 FL    MCH 26.6 26.0 - 34.0 PG    MCHC 30.0 30.0 - 36.5 g/dL    RDW 13.2 11.5 - 14.5 %    PLATELET 435 009 - 628 K/uL    MPV 10.0 8.9 - 12.9 FL    NRBC 0.0 0  WBC    ABSOLUTE NRBC 0.00 0.00 - 0.01 K/uL    NEUTROPHILS 67 32 - 75 %    LYMPHOCYTES 15 12 - 49 % MONOCYTES 13 5 - 13 %    EOSINOPHILS 5 0 - 7 %    BASOPHILS 0 0 - 1 %    IMMATURE GRANULOCYTES 0 0.0 - 0.5 %    ABS. NEUTROPHILS 4.1 1.8 - 8.0 K/UL    ABS. LYMPHOCYTES 0.9 0.8 - 3.5 K/UL    ABS. MONOCYTES 0.8 0.0 - 1.0 K/UL    ABS. EOSINOPHILS 0.3 0.0 - 0.4 K/UL    ABS. BASOPHILS 0.0 0.0 - 0.1 K/UL    ABS. IMM. GRANS. 0.0 0.00 - 0.04 K/UL    DF AUTOMATED     METABOLIC PANEL, COMPREHENSIVE   Result Value Ref Range    Sodium 137 136 - 145 mmol/L    Potassium 4.9 3.5 - 5.1 mmol/L    Chloride 103 97 - 108 mmol/L    CO2 29 21 - 32 mmol/L    Anion gap 5 5 - 15 mmol/L    Glucose 94 65 - 100 mg/dL    BUN 9 6 - 20 MG/DL    Creatinine 1.08 0.70 - 1.30 MG/DL    BUN/Creatinine ratio 8 (L) 12 - 20      GFR est AA >60 >60 ml/min/1.73m2    GFR est non-AA >60 >60 ml/min/1.73m2    Calcium 8.8 8.5 - 10.1 MG/DL    Bilirubin, total 0.3 0.2 - 1.0 MG/DL    ALT (SGPT) 24 12 - 78 U/L    AST (SGOT) 30 15 - 37 U/L    Alk.  phosphatase 98 45 - 117 U/L    Protein, total 7.5 6.4 - 8.2 g/dL    Albumin 3.6 3.5 - 5.0 g/dL    Globulin 3.9 2.0 - 4.0 g/dL    A-G Ratio 0.9 (L) 1.1 - 2.2     DRUG SCREEN, URINE   Result Value Ref Range    AMPHETAMINES NEGATIVE  NEG      BARBITURATES NEGATIVE  NEG      BENZODIAZEPINES NEGATIVE  NEG      COCAINE NEGATIVE  NEG      METHADONE NEGATIVE  NEG      OPIATES NEGATIVE  NEG      PCP(PHENCYCLIDINE) NEGATIVE  NEG      THC (TH-CANNABINOL) NEGATIVE  NEG      Drug screen comment (NOTE)    TROPONIN I   Result Value Ref Range    Troponin-I, Qt. <0.05 <0.05 ng/mL   CK W/ CKMB & INDEX   Result Value Ref Range     (H) 39 - 308 U/L    CK - MB <1.0 <3.6 NG/ML    CK-MB Index Cannot be calculated 0.0 - 2.5     D DIMER   Result Value Ref Range    D-dimer 0.46 0.00 - 0.65 mg/L FEU   CBC W/O DIFF   Result Value Ref Range    WBC 4.0 (L) 4.1 - 11.1 K/uL    RBC 4.65 4.10 - 5.70 M/uL    HGB 12.5 12.1 - 17.0 g/dL    HCT 40.6 36.6 - 50.3 %    MCV 87.3 80.0 - 99.0 FL    MCH 26.9 26.0 - 34.0 PG    MCHC 30.8 30.0 - 36.5 g/dL    RDW 13.1 11.5 - 14.5 %    PLATELET 631 467 - 116 K/uL    MPV 9.4 8.9 - 12.9 FL    NRBC 0.0 0  WBC    ABSOLUTE NRBC 0.00 0.00 - 3.50 K/uL   METABOLIC PANEL, BASIC   Result Value Ref Range    Sodium 140 136 - 145 mmol/L    Potassium 3.9 3.5 - 5.1 mmol/L    Chloride 105 97 - 108 mmol/L    CO2 27 21 - 32 mmol/L    Anion gap 8 5 - 15 mmol/L    Glucose 92 65 - 100 mg/dL    BUN 8 6 - 20 MG/DL    Creatinine 1.09 0.70 - 1.30 MG/DL    BUN/Creatinine ratio 7 (L) 12 - 20      GFR est AA >60 >60 ml/min/1.73m2    GFR est non-AA >60 >60 ml/min/1.73m2    Calcium 8.4 (L) 8.5 - 10.1 MG/DL   TROPONIN I   Result Value Ref Range    Troponin-I, Qt. <0.05 <0.05 ng/mL   CBC W/O DIFF   Result Value Ref Range    WBC 3.6 (L) 4.1 - 11.1 K/uL    RBC 4.38 4.10 - 5.70 M/uL    HGB 11.7 (L) 12.1 - 17.0 g/dL    HCT 38.2 36.6 - 50.3 %    MCV 87.2 80.0 - 99.0 FL    MCH 26.7 26.0 - 34.0 PG    MCHC 30.6 30.0 - 36.5 g/dL    RDW 13.1 11.5 - 14.5 %    PLATELET 098 343 - 996 K/uL    MPV 9.4 8.9 - 12.9 FL    NRBC 0.0 0  WBC    ABSOLUTE NRBC 0.00 0.00 - 9.97 K/uL   METABOLIC PANEL, BASIC   Result Value Ref Range    Sodium 138 136 - 145 mmol/L    Potassium 4.2 3.5 - 5.1 mmol/L    Chloride 104 97 - 108 mmol/L    CO2 23 21 - 32 mmol/L    Anion gap 11 5 - 15 mmol/L    Glucose 117 (H) 65 - 100 mg/dL    BUN 8 6 - 20 MG/DL    Creatinine 1.12 0.70 - 1.30 MG/DL    BUN/Creatinine ratio 7 (L) 12 - 20      GFR est AA >60 >60 ml/min/1.73m2    GFR est non-AA >60 >60 ml/min/1.73m2    Calcium 7.9 (L) 8.5 - 10.1 MG/DL   CK   Result Value Ref Range     (H) 39 - 308 U/L   CBC W/O DIFF   Result Value Ref Range    WBC 4.0 (L) 4.1 - 11.1 K/uL    RBC 4.44 4.10 - 5.70 M/uL    HGB 11.6 (L) 12.1 - 17.0 g/dL    HCT 38.5 36.6 - 50.3 %    MCV 86.7 80.0 - 99.0 FL    MCH 26.1 26.0 - 34.0 PG    MCHC 30.1 30.0 - 36.5 g/dL    RDW 13.0 11.5 - 14.5 %    PLATELET 760 660 - 394 K/uL    MPV 9.5 8.9 - 12.9 FL    NRBC 0.0 0  WBC    ABSOLUTE NRBC 0.00 0.00 - 0.01 K/uL METABOLIC PANEL, BASIC   Result Value Ref Range    Sodium 141 136 - 145 mmol/L    Potassium 3.9 3.5 - 5.1 mmol/L    Chloride 107 97 - 108 mmol/L    CO2 24 21 - 32 mmol/L    Anion gap 10 5 - 15 mmol/L    Glucose 92 65 - 100 mg/dL    BUN 9 6 - 20 MG/DL    Creatinine 0.97 0.70 - 1.30 MG/DL    BUN/Creatinine ratio 9 (L) 12 - 20      GFR est AA >60 >60 ml/min/1.73m2    GFR est non-AA >60 >60 ml/min/1.73m2    Calcium 8.1 (L) 8.5 - 10.1 MG/DL   TSH 3RD GENERATION   Result Value Ref Range    TSH 1.23 0.36 - 3.74 uIU/mL   CK   Result Value Ref Range     (H) 39 - 308 U/L   EKG, 12 LEAD, INITIAL   Result Value Ref Range    Ventricular Rate 76 BPM    Atrial Rate 76 BPM    P-R Interval 196 ms    QRS Duration 88 ms    Q-T Interval 370 ms    QTC Calculation (Bezet) 416 ms    Calculated P Axis 62 degrees    Calculated R Axis 45 degrees    Calculated T Axis 49 degrees    Diagnosis       Normal sinus rhythm  Normal ECG  When compared with ECG of 14-JAN-2013 22:32,  Nonspecific T wave abnormality, improved in Lateral leads  Confirmed by Alexa Conteh [id number]) on 3/17/2019 10:07:11 PM     DUPLEX CAROTID BILATERAL   Result Value Ref Range    Right cca dist sys 82.6 cm/s    Right CCA dist bunch 23.5 cm/s    Right CCA prox sys 102.3 cm/s    Right CCA prox bunch 19.5 cm/s    Right ICA dist sys 72.8 cm/s    Right ICA dist bunch 35.3 cm/s    Right ICA mid sys 76.7 cm/s    Right ICA mid bunch 31.4 cm/s    Right ICA prox sys 62.9 cm/s    Right ICA prox bunch 27.4 cm/s    Right vertebral sys 45.2 cm/s    RIGHT VERTEBRAL ARTERY D 18.00 cm/s    Right ICA/CCA sys 0.9     Left CCA dist sys 76.6 cm/s    Left CCA dist bunch 20.7 cm/s    Left CCA prox sys 99.9 cm/s    Left CCA prox bunch 18.4 cm/s    Left ICA dist sys 81.0 cm/s    Left ICA dist bunch 34.3 cm/s    Left ICA mid sys 59.6 cm/s    Left ICA mid bunch 27.0 cm/s    Left ICA prox sys 88.2 cm/s    Left ICA prox bunch 32.4 cm/s    Left vertebral sys 24.5 cm/s    LEFT VERTEBRAL ARTERY D 0.00 cm/s    Left ICA/CCA sys 1.15    ECHO ADULT COMPLETE   Result Value Ref Range    Right Atrial Area 4C 13.62 cm2    Ao Root D 2.92 cm    Aortic Valve Area by Planimetry 2.8 cm2    LVIDd 4.90 4.2 - 5.9 cm    LVPWd 1.04 (A) 0.6 - 1.0 cm    LVIDs 3.82 cm    IVSd 0.65 0.6 - 1.0 cm    LV ES Vol A4C 74.7 mL    LVOT d 2.02 cm    LVOT Peak Velocity 96.00 cm/s    LVOT Peak Gradient 3.7 mmHg    Mitral Valve Area by Planimetry 7.7 cm2    MVA (PHT) 2.2 cm2    MV A Zenon 41.31 cm/s    MV E [place] 45.99 cm/s    MV E/A 1.11     MV Mean Gradient 0.8 mmHg    Mitral Valve Annulus Velocity Time Integral 23.09 cm    Left Atrium to Aortic Root Ratio 1.05     LV Ejection Fraction MOD 4C 53 %    LA Vol 4C 29.35 18 - 58 mL    LA Area 4C 13.7 cm2    LV Mass .2 88 - 224 g    LV Mass AL Index 72.4 49 - 115 g/m2    MV Peak Gradient 2.3 mmHg    LV ED Vol A4C 157.1 mL    Est. RA Pressure 5.0 mmHg    Mitral Valve E Wave Deceleration Time 95.1 ms    Mitral Valve Pressure Half-time 99.9 ms    Left Atrium Major Axis 3.07 cm    Pulmonic Regurgitant End Max Velocity 79.28 cm/s    Pulmonic Valve Max Velocity 75.91 cm/s    Mitral Valve Max Velocity 76.09 cm/s    LA Vol Index 13.10 16 - 28 ml/m2    LVED Vol Index A4C 70.1 mL/m2    LVES Vol Index A4C 33.3 mL/m2    PV peak gradient 2.3 mmHg       Assessment/Plan:    ICD-10-CM ICD-9-CM    1.  Well adult exam  P05.79 F22.8 METABOLIC PANEL, COMPREHENSIVE      CBC W/O DIFF      HIV 1/2 AG/AB, 4TH GENERATION,W RFLX CONFIRM      LIPID PANEL      CHLAMYDIA/GC PCR      T VAGINALIS AMPLIFICATION      HEPATITIS C QT BY PCR WITH REFLEX GENOTYPE      PSA, DIAGNOSTIC (PROSTATE SPECIFIC AG)      REFERRAL TO VASCULAR SURGERY   2. Vertebral-basilar artery occlusive syndrome  S76.1 085.57 METABOLIC PANEL, COMPREHENSIVE      CBC W/O DIFF      HIV 1/2 AG/AB, 4TH GENERATION,W RFLX CONFIRM      LIPID PANEL      CHLAMYDIA/GC PCR      T VAGINALIS AMPLIFICATION      HEPATITIS C QT BY PCR WITH REFLEX GENOTYPE      PSA, DIAGNOSTIC (PROSTATE SPECIFIC AG)      REFERRAL TO VASCULAR SURGERY   3. Colon cancer screening  Z12.11 V76.51 REFERRAL TO GASTROENTEROLOGY   4. Thyromegaly  E01.0 240.9 US THYROID/PARATHYROID/SOFT TISS   5. Obesity (BMI 35.0-39.9 without comorbidity)  E66.9 278.00      Orders Placed This Encounter    CHLAMYDIA/GC PCR     Order Specific Question:   Sample source     Answer:   Urine [258]     Order Specific Question:   Specimen source     Answer:   Urine [258]    US THYROID/PARATHYROID/SOFT TISS     Standing Status:   Future     Standing Expiration Date:   57/16/0365    METABOLIC PANEL, COMPREHENSIVE    CBC W/O DIFF    HIV 1/2 AG/AB, 4TH GENERATION,W RFLX CONFIRM    LIPID PANEL    T VAGINALIS AMPLIFICATION     Order Specific Question:   Specimen source     Answer:   Urine [258]    HEPATITIS C QT BY PCR WITH REFLEX GENOTYPE    PROSTATE SPECIFIC AG    [facility]     Referral Priority:   Routine     Referral Type:   Consultation     Referral Reason:   Specialty Services Required     Referred to Provider:   Cathleen Martin MD     Number of Visits Requested:   [address] [facility]ascular [facility]     Referral Priority:   Routine     Referral Type:   Consultation     Referral Reason:   Specialty Services Required     Referral Location:   [facility]     Referred to Provider:   Gustavo Clemente MD     Number of Visits Requested:   1   1.  Well adult exam    - METABOLIC PANEL, COMPREHENSIVE  - CBC W/O DIFF  - HIV 1/2 AG/AB, 4TH GENERATION,W RFLX CONFIRM  - LIPID PANEL  - CHLAMYDIA/GC PCR  - T VAGINALIS AMPLIFICATION  - HEPATITIS C QT BY PCR WITH REFLEX GENOTYPE  - PSA, DIAGNOSTIC (PROSTATE SPECIFIC AG)  - REFERRAL TO VASCULAR SURGERY    2. Vertebral-basilar artery occlusive syndrome  F/u vascular  - METABOLIC PANEL, COMPREHENSIVE  - CBC W/O DIFF  - HIV 1/2 AG/AB, 4TH GENERATION,W RFLX CONFIRM  - LIPID PANEL  - CHLAMYDIA/GC PCR  - T VAGINALIS AMPLIFICATION  - HEPATITIS C QT BY PCR WITH REFLEX GENOTYPE  - PSA, DIAGNOSTIC (PROSTATE SPECIFIC AG)  - REFERRAL TO VASCULAR SURGERY    3. Colon cancer screening    - REFERRAL TO GASTROENTEROLOGY    4. Thyromegaly    - US THYROID/PARATHYROID/SOFT TISS; Future    5. Obesity (BMI 35.0-39.9 without comorbidity)  D/w pt wt loss ~ 50lb/healthy diet and reg exercise      There are no Patient Instructions on file for this visit. Follow-up and Dispositions    · Return in about 1 year (around 7/14/2021) for annual PE. I have reviewed with the patient details of the assessment and plan and all questions were answered. Relevent patient education was performed. The most recent lab findings were reviewed with the patient. An After Visit Summary was printed and given to the patient.

## 2020-07-28 LAB
ALBUMIN SERPL-MCNC: 4.6 G/DL (ref 3.8–4.9)
ALBUMIN/GLOB SERPL: 1.7 {RATIO} (ref 1.2–2.2)
ALP SERPL-CCNC: 101 IU/L (ref 39–117)
ALT SERPL-CCNC: 13 IU/L (ref 0–44)
AST SERPL-CCNC: 12 IU/L (ref 0–40)
BILIRUB SERPL-MCNC: 0.5 MG/DL (ref 0–1.2)
BUN SERPL-MCNC: 13 MG/DL (ref 6–24)
BUN/CREAT SERPL: 11 (ref 9–20)
C TRACH RRNA SPEC QL NAA+PROBE: NEGATIVE
CALCIUM SERPL-MCNC: 9.7 MG/DL (ref 8.7–10.2)
CHLORIDE SERPL-SCNC: 103 MMOL/L (ref 96–106)
CHOLEST SERPL-MCNC: 190 MG/DL (ref 100–199)
CO2 SERPL-SCNC: 21 MMOL/L (ref 20–29)
CREAT SERPL-MCNC: 1.17 MG/DL (ref 0.76–1.27)
ERYTHROCYTE [DISTWIDTH] IN BLOOD BY AUTOMATED COUNT: 13.7 % (ref 11.6–15.4)
GLOBULIN SER CALC-MCNC: 2.7 G/DL (ref 1.5–4.5)
GLUCOSE SERPL-MCNC: 96 MG/DL (ref 65–99)
HCT VFR BLD AUTO: 40.9 % (ref 37.5–51)
HCV GENOTYPE: NORMAL
HCV RNA SERPL NAA+PROBE-ACNC: NORMAL IU/ML
HCV RNA SERPL NAA+PROBE-LOG IU: NORMAL LOG10 IU/ML
HDLC SERPL-MCNC: 53 MG/DL
HGB BLD-MCNC: 13.2 G/DL (ref 13–17.7)
HIV 1+2 AB+HIV1 P24 AG SERPL QL IA: NON REACTIVE
INTERPRETATION, 910389: NORMAL
LDLC SERPL CALC-MCNC: 119 MG/DL (ref 0–99)
MCH RBC QN AUTO: 26.3 PG (ref 26.6–33)
MCHC RBC AUTO-ENTMCNC: 32.3 G/DL (ref 31.5–35.7)
MCV RBC AUTO: 82 FL (ref 79–97)
N GONORRHOEA RRNA SPEC QL NAA+PROBE: NEGATIVE
PLATELET # BLD AUTO: 280 X10E3/UL (ref 150–450)
POTASSIUM SERPL-SCNC: 4.4 MMOL/L (ref 3.5–5.2)
PROT SERPL-MCNC: 7.3 G/DL (ref 6–8.5)
PSA SERPL-MCNC: 0.5 NG/ML (ref 0–4)
RBC # BLD AUTO: 5.02 X10E6/UL (ref 4.14–5.8)
SODIUM SERPL-SCNC: 141 MMOL/L (ref 134–144)
T VAGINALIS DNA SPEC QL NAA+PROBE: NEGATIVE
TEST INFORMATION: NORMAL
TRIGL SERPL-MCNC: 89 MG/DL (ref 0–149)
VLDLC SERPL CALC-MCNC: 18 MG/DL (ref 5–40)
WBC # BLD AUTO: 6.9 X10E3/UL (ref 3.4–10.8)

## 2020-11-23 ENCOUNTER — HOSPITAL ENCOUNTER (EMERGENCY)
Age: 55
Discharge: HOME | End: 2020-11-23
Attending: EMERGENCY MEDICINE
Payer: COMMERCIAL

## 2020-11-23 VITALS
OXYGEN SATURATION: 100 % | SYSTOLIC BLOOD PRESSURE: 129 MMHG | HEIGHT: 69 IN | DIASTOLIC BLOOD PRESSURE: 87 MMHG | HEART RATE: 87 BPM | TEMPERATURE: 98.2 F | RESPIRATION RATE: 16 BRPM | WEIGHT: 244.71 LBS | BODY MASS INDEX: 36.24 KG/M2

## 2020-11-23 DIAGNOSIS — L03.032 PARONYCHIA OF TOE, LEFT: Primary | ICD-10-CM

## 2020-11-23 PROCEDURE — 75810000283 HC INJECTION NERVE BLOCK

## 2020-11-23 PROCEDURE — 74011000250 HC RX REV CODE- 250: Performed by: EMERGENCY MEDICINE

## 2020-11-23 PROCEDURE — 99282 EMERGENCY DEPT VISIT SF MDM: CPT

## 2020-11-23 RX ORDER — CEPHALEXIN 500 MG/1
500 CAPSULE ORAL 4 TIMES DAILY
Qty: 28 CAP | Refills: 0 | Status: SHIPPED | OUTPATIENT
Start: 2020-11-23 | End: 2020-11-30

## 2020-11-23 RX ORDER — LIDOCAINE HYDROCHLORIDE 10 MG/ML
5 INJECTION, SOLUTION EPIDURAL; INFILTRATION; INTRACAUDAL; PERINEURAL ONCE
Status: COMPLETED | OUTPATIENT
Start: 2020-11-23 | End: 2020-11-23

## 2020-11-23 RX ORDER — BUPIVACAINE HYDROCHLORIDE 5 MG/ML
5 INJECTION, SOLUTION EPIDURAL; INTRACAUDAL
Status: COMPLETED | OUTPATIENT
Start: 2020-11-23 | End: 2020-11-23

## 2020-11-23 RX ADMIN — LIDOCAINE HYDROCHLORIDE 5 ML: 10 INJECTION, SOLUTION EPIDURAL; INFILTRATION; INTRACAUDAL at 22:55

## 2020-11-23 RX ADMIN — BUPIVACAINE HYDROCHLORIDE 25 MG: 5 INJECTION, SOLUTION EPIDURAL; INTRACAUDAL at 22:55

## 2020-11-23 NOTE — Clinical Note
[facility] EMERGENCY DEPT 
[address] [address] 
[phone] Work/School Note Date: 11/23/2020 To Whom It May concern: 
 
 
Miriam Hatfield was seen and treated today in the emergency room by the following provider(s): 
Attending Provider: Eri Timmons MD. Miriam Hatfield is excused from work/school on 11/23/20. He is clear to return to work/school on 11/24/20.    
 
 
Sincerely, 
 
 
 
 
Issac Castillo MD

## 2020-11-23 NOTE — LETTER
[facility] EMERGENCY DEPT 
[address] [address] 
[phone] Work/School Note Date: 11/23/2020 To Whom It May concern: 
 
Emanuel Kebede was seen and treated today in the emergency room by the following provider(s): 
Attending Provider: Alejandra Luis MD. Emanuel Kebede may return to work on 11/25/20. Sincerely, PABLO Schilling RN

## 2020-11-24 NOTE — DISCHARGE INSTRUCTIONS
Patient Education        Paronychia: Care Instructions  Your Care Instructions  Paronychia (say \"xuun-nx-AL-kavitha-uh\") is an infection of the skin around a fingernail or toenail. It happens when germs enter through a break in the skin. The doctor may have made a small cut in the infected area to drain the pus. Most cases of paronychia improve in a few days. But watch your symptoms and follow your doctor's advice. Though rare, a mild case can turn into something more serious and infect your entire finger or toe. Also, it is possible for an infection to return. Follow-up care is a key part of your treatment and safety. Be sure to make and go to all appointments, and call your doctor if you are having problems. It's also a good idea to know your test results and keep a list of the medicines you take. How can you care for yourself at home? · If your doctor told you how to care for your infected nail, follow the doctor's instructions. If you did not get instructions, follow this general advice:  ? Wash the area with clean water 2 times a day. Don't use hydrogen peroxide or alcohol, which can slow healing. ? You may cover the area with a thin layer of petroleum jelly, such as Vaseline, and a nonstick bandage. ? Apply more petroleum jelly and replace the bandage as needed. · If your doctor prescribed antibiotics, take them as directed. Do not stop taking them just because you feel better. You need to take the full course of antibiotics. · Take an over-the-counter pain medicine, such as acetaminophen (Tylenol), ibuprofen (Advil, Motrin), or naproxen (Aleve). Read and follow all instructions on the label. · Do not take two or more pain medicines at the same time unless the doctor told you to. Many pain medicines have acetaminophen, which is Tylenol. Too much acetaminophen (Tylenol) can be harmful. · Prop up the toe or finger so that it is higher than the level of your heart.  This will help with pain and swelling. · Apply heat. Put a warm water bottle, heating pad set on low, or warm cloth on your finger or toe. Do not go to sleep with a heating pad on your skin. · Soak the area in warm water twice a day for 15 minutes each time. After soaking, dry the area well and apply a thin layer of petroleum jelly, such as Vaseline. Put on a new bandage. When should you call for help? Call your doctor now or seek immediate medical care if:    · You have signs of new or worsening infection, such as:  ? Increased pain, swelling, warmth, or redness. ? Red streaks leading from the infected skin. ? Pus draining from the area. ? A fever. Watch closely for changes in your health, and be sure to contact your doctor if:    · You do not get better as expected. Where can you learn more? Go to http://www.henriquez.com/  Enter C435 in the search box to learn more about \"Paronychia: Care Instructions. \"  Current as of: July 2, 2020               Content Version: 12.6  © 8391-1469 [employer]. Care instructions adapted under license by [employer] (which disclaims liability or warranty for this information). If you have questions about a medical condition or this instruction, always ask your healthcare professional. [address] any warranty or liability for your use of this information.

## 2020-11-24 NOTE — ED NOTES
Discharge instructions were given to the patient by [facility]. The patient left the Emergency Department ambulatory, alert and oriented and in no acute distress with 1 prescriptions. The patient was encouraged to call or return to the ED for worsening issues or problems and was encouraged to schedule a follow up appointment for continuing care. The patient verbalized understanding of discharge instructions and prescriptions, all questions were answered. The patient has no further concerns at this time. Pt to f/u with podiatry.

## 2020-11-24 NOTE — ED NOTES
Pt presents ambulatory to ED complaining of L great toe onychocryptosis x 1 month with increase pain, swelling, and drainage x 2 days. Pt is alert and oriented x 4, RR even and unlabored, skin is warm and dry. Assesment completed and pt updated on plan of care. Emergency Department Nursing Plan of Care       The Nursing Plan of Care is developed from the Nursing assessment and Emergency Department Attending provider initial evaluation. The plan of care may be reviewed in the ED Provider note.     The Plan of Care was developed with the following considerations:   Patient / Family readiness to learn indicated by:verbalized understanding  Persons(s) to be included in education: patient  Barriers to Learning/Limitations:No    Signed     [address], RN    11/23/2020   10:38 PM

## 2020-11-24 NOTE — ED PROVIDER NOTES
EMERGENCY DEPARTMENT HISTORY AND PHYSICAL EXAM      Date: 11/23/2020  Patient Name: Joana Devries    History of Presenting Illness     Chief Complaint   Patient presents with    Toe Pain       History Provided By: Patient    HPI: Joana Devries, 63 y.o. male with PMHx significant for childhood seizures who presents with a chief complaint of left great toe pain. Patient states that he has had ongoing pain in the toe for several weeks. Had an ingrown toenail and actually got a pedicure to help with it at the beginning of the symptoms. However, since then has had increased redness, swelling, and pain. Has been soaking the toe in Epsom salts with no relief of his symptoms. No fever. Has been otherwise in his normal state of health. PCP: Kathleen Ellis MD    There are no other complaints, changes, or physical findings at this time. Current Facility-Administered Medications   Medication Dose Route Frequency Provider Last Rate Last Dose    lidocaine (XYLOCAINE) 10 mg/mL (1 %) injection 5 mL  5 mL SubCUTAneous ONCE Sonia Ly MD        bupivacaine (PF) (MARCAINE) 0.5 % (5 mg/mL) injection 25 mg  5 mL Peripheral Nerve Block NOW Sonia Ly MD         Past History     Past Medical History:  Past Medical History:   Diagnosis Date    Seizures ([address]     seizure as a child     Past Surgical History:  Past Surgical History:   Procedure Laterality Date    HX OTHER SURGICAL      cyst removed from right shoulder     Family History:  History reviewed. No pertinent family history. Social History:  Social History     Tobacco Use    Smoking status: Never Smoker    Smokeless tobacco: Never Used   Substance Use Topics    Alcohol use: No     Comment: socially    Drug use: No     Allergies: Allergies   Allergen Reactions    Egg Derived Nausea and Vomiting     Review of Systems   Review of Systems   Constitutional: Negative for chills and fever.    HENT: Negative for congestion, rhinorrhea and sore throat. Respiratory: Negative for cough and shortness of breath. Cardiovascular: Negative for chest pain. Gastrointestinal: Negative for abdominal pain, nausea and vomiting. Genitourinary: Negative for dysuria and urgency. Musculoskeletal: Positive for arthralgias. Skin: Positive for color change. Negative for rash. Neurological: Negative for dizziness, light-headedness and headaches. All other systems reviewed and are negative. Physical Exam   Physical Exam  Vitals signs and nursing note reviewed. Constitutional:       General: He is not in acute distress. Appearance: He is well-developed. HENT:      Head: Normocephalic and atraumatic. Eyes:      Conjunctiva/sclera: Conjunctivae normal.      Pupils: Pupils are equal, round, and reactive to light. Neck:      Musculoskeletal: Normal range of motion. Cardiovascular:      Rate and Rhythm: Normal rate and regular rhythm. Pulmonary:      Effort: Pulmonary effort is normal. No respiratory distress. Breath sounds: Normal breath sounds. No stridor. Abdominal:      General: There is no distension. Palpations: Abdomen is soft. Tenderness: There is no abdominal tenderness. Musculoskeletal: Normal range of motion. Feet:    Skin:     General: Skin is warm and dry. Neurological:      Mental Status: He is alert and oriented to person, place, and time. Diagnostic Study Results   Labs -   No results found for this or any previous visit (from the past 12 hour(s)). Radiologic Studies -   No orders to display     No results found. Medical Decision Making   I am the first provider for this patient. I reviewed the vital signs, available nursing notes, past medical history, past surgical history, family history and social history. Vital Signs-Reviewed the patient's vital signs.   Patient Vitals for the past 12 hrs:   Temp Pulse Resp BP SpO2   11/23/20 2220 98.2 °F (36.8 °C) 87 16 129/87 100 %       Pulse Oximetry Analysis - 100% on ra      Records Reviewed: Nursing Notes and Old Medical Records    Provider Notes (Medical Decision Making):   Patient presents with left great toe pain swelling. Exam is consistent with paronychia. Patient unable to tolerate further palpation of the area to express purulent so will digitally block open area. I advised that he follow-up with podiatrist for further management. Will discharge on oral antibiotics. ED Course:   Initial assessment performed. The patients presenting problems have been discussed, and they are in agreement with the care plan formulated and outlined with them. I have encouraged them to ask questions as they arise throughout their visit. ED Course as of Nov 25 1033   Mon Nov 23, 2020   8627 Procedure Note - Digital Block:   10:59 PM  Performed by: Gerry Carrington MD  Lidocaine 1% without epinephrine and 0.5% bupivicaine in 50/50 mix used to perform digital block of Left first toe(s). The procedure took 1-15 minutes, and pt tolerated well      [BRIANNA]      ED Course User Index  Jacob Dowling MD     Following good anesthesia with digital block cuticle of the left great toe was gently lifted using an 11 blade. A scant amount of additional purulence was expressed. Patient tolerated the procedure well. Procedure took between 1 and 15 minutes. Procedures:  Procedures    Critical Care:  none    Disposition:  Discharge Note:  The patient has been re-evaluated and is ready for discharge. Reviewed available results with patient. Counseled patient on diagnosis and care plan. Patient has expressed understanding, and all questions have been answered. Patient agrees with plan and agrees to follow up as recommended, or to return to the ED if their symptoms worsen. Discharge instructions have been provided and explained to the patient, along with reasons to return to the ED. PLAN:  1.    Discharge Medication List as of 11/23/2020 11:20 PM      START taking these medications    Details   cephALEXin (Keflex) 500 mg capsule Take 1 Cap by mouth four (4) times daily for 7 days. , Normal, Disp-28 Cap,R-0           2. Follow-up Information     Follow up With Specialties Details Why Contact Tone Dick DPM Podiatry Schedule an appointment as soon as possible for a visit  [address] [place] [address] 1316 [facility]      Gilbert Dang MD Internal Medicine Schedule an appointment as soon as possible for a visit  [address] [place] [id number] [phone]      [address] [place] [address] DEPT Emergency Medicine  As needed, If symptoms worsen Nicholas Stark  [phone]        Return to ED if worse     Diagnosis     Clinical Impression:   1. Paronychia of toe, left        This note will not be viewable in MyChart. Please note that this dictation was completed with [employer], the computer voice recognition software. Quite often unanticipated grammatical, syntax, homophones, and other interpretive errors are inadvertently transcribed by the computer software. Please disregard these errors.   Please excuse any errors that have escaped final proofreading no discharge, no irritation, no pain, no redness, and no visual changes.

## 2021-07-30 ENCOUNTER — OFFICE VISIT (OUTPATIENT)
Dept: INTERNAL MEDICINE CLINIC | Age: 56
End: 2021-07-30
Payer: COMMERCIAL

## 2021-07-30 VITALS
SYSTOLIC BLOOD PRESSURE: 132 MMHG | WEIGHT: 247 LBS | HEART RATE: 67 BPM | RESPIRATION RATE: 19 BRPM | OXYGEN SATURATION: 97 % | HEIGHT: 69 IN | BODY MASS INDEX: 36.58 KG/M2 | DIASTOLIC BLOOD PRESSURE: 85 MMHG | TEMPERATURE: 98.5 F

## 2021-07-30 DIAGNOSIS — I77.9 VERTEBRAL ARTERY DISEASE (HCC): Primary | ICD-10-CM

## 2021-07-30 PROCEDURE — 99213 OFFICE O/P EST LOW 20 MIN: CPT | Performed by: INTERNAL MEDICINE

## 2021-07-30 NOTE — PROGRESS NOTES
Chief Complaint   Patient presents with    Referral Follow Up     GI    Referral Request     Cardio     1. Have you been to the ER, urgent care clinic since your last visit? Hospitalized since your last visit? No    2. Have you seen or consulted any other health care providers outside of the [address] [place] [address] since your last visit? Include any pap smears or colon screening.  No

## 2021-08-02 PROBLEM — L02.213 CHEST WALL ABSCESS: Status: RESOLVED | Noted: 2019-03-17 | Resolved: 2021-08-02

## 2021-08-02 PROBLEM — R74.8 ELEVATED CK: Status: RESOLVED | Noted: 2019-03-17 | Resolved: 2021-08-02

## 2021-08-02 PROBLEM — E01.0 THYROMEGALY: Status: RESOLVED | Noted: 2019-04-24 | Resolved: 2021-08-02

## 2021-08-02 NOTE — PROGRESS NOTES
Kannan Kim is a 63 y.o. male and presents with Referral Follow Up (GI) and Referral Request (Cardio)  . Subjective:      Last appt 1 year ago. Pt requests vascular referral re-given as he has yet to schedule a f/u appt. Pt was previously uninsured. Pt reports he completed his colonoscopy recently (report NOT received)    PMH-H/o childhood seizures   H/o syncope-pt reports he was hospitalized in 2019 he needs a \"stent in his heart\", but he had no insurance. Pt requests referral to f/u w that provider, but does not have the info. No consult in our system. I will refer him back to the vascular specialist he saw after hospitalization 2019 for vertebral artery dz. ?enlarged left thyroid on carotid duplex last year    [address] \"cyst\" removal    SH-   Works in dietary   + sex active + condoms all the time last std check ~ 2 yrs ago    Lives with friend   No tobacco, occas alcohol, no illicit drug use       FH-father  ~70 ? ? DM complications   Mother  ~14 DM complication   2 siblings healthy        Review of Systems  Constitutional: negative for fevers, chills, anorexia and weight loss  Eyes:   negative for visual disturbance and irritation  ENT:   negative for tinnitus,sore throat,nasal congestion,ear pains. hoarseness  Respiratory:  negative for cough, hemoptysis, dyspnea,wheezing  CV:   negative for chest pain, palpitations, lower extremity edema  GI:   negative for nausea, vomiting, diarrhea, abdominal pain,melena  Musculoskel: negative for myalgias, arthralgias, back pain, muscle weakness, joint pain  Neurological:  negative for headaches, dizziness, vertigo, memory problems and gait   Behavl/Psych: negative for feelings of anxiety, depression, mood changes    Past Medical History:   Diagnosis Date    Seizures ([facility] [address]     seizure as a child     Past Surgical History:   Procedure Laterality Date    HX OTHER SURGICAL      cyst removed from right shoulder     Social History     Socioeconomic History    Marital status: LEGALLY      Spouse name: Not on file    Number of children: Not on file    Years of education: Not on file    Highest education level: Not on file   Tobacco Use    Smoking status: Never Smoker    Smokeless tobacco: Never Used   Vaping Use    Vaping Use: Never used   Substance and Sexual Activity    Alcohol use: No     Comment: socially    Drug use: No    Sexual activity: Yes     Partners: Female     Birth control/protection: None     Social Determinants of Health     Financial Resource Strain:     Difficulty of Paying Living Expenses:    Food Insecurity:     Worried About Running Out of Food in the Last Year:     [address] in the Last Year:    Transportation Needs:     Lack of Transportation (Medical):  Lack of Transportation (Non-Medical):    Physical Activity:     Days of Exercise per Week:     Minutes of Exercise per Session:    Stress:     Feeling of Stress :    Social Connections:     Frequency of Communication with Friends and Family:     Frequency of Social Gatherings with Friends and Family:     Attends Mu-ism Services:     Active Member of Clubs or Organizations:     Attends Club or Organization Meetings:     Marital Status:      History reviewed. No pertinent family history.     Allergies   Allergen Reactions    Egg Derived Nausea and Vomiting       Objective:  Visit Vitals  /85 (BP 1 Location: Left upper arm, BP Patient Position: Sitting, BP Cuff Size: Large adult)   Pulse 67   Temp 98.5 °F (36.9 °C) (Temporal)   Resp 19   Ht 5' 9\" (1.753 m)   Wt 247 lb (112 kg)   SpO2 97%   BMI 36.48 kg/m²     Physical Exam:   General appearance - alert,obese pleasant  Mental status - alert, oriented to person, place, and time  EYE-EOMI  Mouth - crowded pharynx (pt denies signif snoring)  Neck - supple, no significant adenopathy  + palp left thyroid>right  Chest - clear to auscultation  Heart - normal rate, regular rhythm, normal S1, S2  Abdomen - soft, nontender, nobese  Ext-peripheral pulses normal, no pedal edema, no clubbing or cyanosis  Skin-Warm and dry. no hyperpigmentation, vitiligo, or suspicious lesions  Neuro -alert, oriented, normal speech, no focal findings or movement disorder noted      Results for orders placed or performed in visit on 07/14/20   CHLAMYDIA/GC PCR   Result Value Ref Range    Chlamydia trachomatis, KATE Negative Negative    Neisseria gonorrhoeae, KATE Negative Negative   METABOLIC PANEL, COMPREHENSIVE   Result Value Ref Range    Glucose 96 65 - 99 mg/dL    BUN 13 6 - 24 mg/dL    Creatinine 1.17 0.76 - 1.27 mg/dL    GFR est non-AA 70 >59 mL/min/1.73    GFR est AA 81 >59 mL/min/1.73    BUN/Creatinine ratio 11 9 - 20    Sodium 141 134 - 144 mmol/L    Potassium 4.4 3.5 - 5.2 mmol/L    Chloride 103 96 - 106 mmol/L    CO2 21 20 - 29 mmol/L    Calcium 9.7 8.7 - 10.2 mg/dL    Protein, total 7.3 6.0 - 8.5 g/dL    Albumin 4.6 3.8 - 4.9 g/dL    GLOBULIN, TOTAL 2.7 1.5 - 4.5 g/dL    A-G Ratio 1.7 1.2 - 2.2    Bilirubin, total 0.5 0.0 - 1.2 mg/dL    Alk.  phosphatase 101 39 - 117 IU/L    AST (SGOT) 12 0 - 40 IU/L    ALT (SGPT) 13 0 - 44 IU/L   CBC W/O DIFF   Result Value Ref Range    WBC 6.9 3.4 - 10.8 x10E3/uL    RBC 5.02 4.14 - 5.80 x10E6/uL    HGB 13.2 13.0 - 17.7 g/dL    HCT 40.9 37.5 - 51.0 %    MCV 82 79 - 97 fL    MCH 26.3 (L) 26.6 - 33.0 pg    MCHC 32.3 31.5 - 35.7 g/dL    RDW 13.7 11.6 - 15.4 %    PLATELET 110 054 - 521 x10E3/uL   HIV 1/2 AG/AB, 4TH GENERATION,W RFLX CONFIRM   Result Value Ref Range    HIV SCREEN 4TH GENERATION WRFX Non Reactive Non Reactive   LIPID PANEL   Result Value Ref Range    Cholesterol, total 190 100 - 199 mg/dL    Triglyceride 89 0 - 149 mg/dL    HDL Cholesterol 53 >39 mg/dL    VLDL, calculated 18 5 - 40 mg/dL    LDL, calculated 119 (H) 0 - 99 mg/dL   T VAGINALIS AMPLIFICATION   Result Value Ref Range    T. vaginalis by KATE Negative Negative   HEPATITIS C QT BY PCR WITH REFLEX GENOTYPE   Result Value Ref Range    Hepatitis C Quantitation HCV Not Detected IU/mL    HCV log10 CANCELED log10 IU/mL    Test information Comment     HCV Genotype CANCELED    PSA, DIAGNOSTIC (PROSTATE SPECIFIC AG)   Result Value Ref Range    Prostate Specific Ag 0.5 0.0 - 4.0 ng/mL   CVD REPORT   Result Value Ref Range    INTERPRETATION Note        Assessment/Plan:    ICD-10-CM ICD-9-CM    1. Vertebral artery disease (HCC)  I77.9 447.9      No orders of the defined types were placed in this encounter. 1. Vertebral artery disease ([facility] [address]  Will refer back to vascular      There are no Patient Instructions on file for this visit. Follow-up and Dispositions    · Return if symptoms worsen or fail to improve. I have reviewed with the patient details of the assessment and plan and all questions were answered. Relevent patient education was performed. The most recent lab findings were reviewed with the patient. An After Visit Summary was printed and given to the patient.

## 2021-12-30 ENCOUNTER — HOSPITAL ENCOUNTER (OUTPATIENT)
Age: 56
Setting detail: OBSERVATION
Discharge: HOME | End: 2021-12-31
Attending: EMERGENCY MEDICINE | Admitting: HOSPITALIST
Payer: COMMERCIAL

## 2021-12-30 ENCOUNTER — APPOINTMENT (OUTPATIENT)
Dept: GENERAL RADIOLOGY | Age: 56
End: 2021-12-30
Attending: EMERGENCY MEDICINE
Payer: COMMERCIAL

## 2021-12-30 ENCOUNTER — APPOINTMENT (OUTPATIENT)
Dept: CT IMAGING | Age: 56
End: 2021-12-30
Attending: NURSE PRACTITIONER
Payer: COMMERCIAL

## 2021-12-30 DIAGNOSIS — R55 SYNCOPE, UNSPECIFIED SYNCOPE TYPE: Primary | ICD-10-CM

## 2021-12-30 LAB
ALBUMIN SERPL-MCNC: 3.6 G/DL (ref 3.5–5)
ALBUMIN/GLOB SERPL: 0.9 {RATIO} (ref 1.1–2.2)
ALP SERPL-CCNC: 98 U/L (ref 45–117)
ALT SERPL-CCNC: 37 U/L (ref 12–78)
ANION GAP SERPL CALC-SCNC: 6 MMOL/L (ref 5–15)
AST SERPL-CCNC: 22 U/L (ref 15–37)
BASOPHILS # BLD: 0 K/UL (ref 0–0.1)
BASOPHILS NFR BLD: 1 % (ref 0–1)
BILIRUB SERPL-MCNC: 0.4 MG/DL (ref 0.2–1)
BUN SERPL-MCNC: 8 MG/DL (ref 6–20)
BUN/CREAT SERPL: 7 (ref 12–20)
CALCIUM SERPL-MCNC: 8.5 MG/DL (ref 8.5–10.1)
CHLORIDE SERPL-SCNC: 103 MMOL/L (ref 97–108)
CO2 SERPL-SCNC: 30 MMOL/L (ref 21–32)
CREAT SERPL-MCNC: 1.19 MG/DL (ref 0.7–1.3)
DIFFERENTIAL METHOD BLD: NORMAL
EOSINOPHIL # BLD: 0.3 K/UL (ref 0–0.4)
EOSINOPHIL NFR BLD: 7 % (ref 0–7)
ERYTHROCYTE [DISTWIDTH] IN BLOOD BY AUTOMATED COUNT: 13.8 % (ref 11.5–14.5)
FLUAV RNA SPEC QL NAA+PROBE: NOT DETECTED
FLUBV RNA SPEC QL NAA+PROBE: NOT DETECTED
GLOBULIN SER CALC-MCNC: 3.8 G/DL (ref 2–4)
GLUCOSE SERPL-MCNC: 123 MG/DL (ref 65–100)
HCT VFR BLD AUTO: 40.9 % (ref 36.6–50.3)
HGB BLD-MCNC: 12.5 G/DL (ref 12.1–17)
IMM GRANULOCYTES # BLD AUTO: 0 K/UL (ref 0–0.04)
IMM GRANULOCYTES NFR BLD AUTO: 0 % (ref 0–0.5)
LYMPHOCYTES # BLD: 1.6 K/UL (ref 0.8–3.5)
LYMPHOCYTES NFR BLD: 33 % (ref 12–49)
MCH RBC QN AUTO: 26.5 PG (ref 26–34)
MCHC RBC AUTO-ENTMCNC: 30.6 G/DL (ref 30–36.5)
MCV RBC AUTO: 86.7 FL (ref 80–99)
MONOCYTES # BLD: 0.5 K/UL (ref 0–1)
MONOCYTES NFR BLD: 10 % (ref 5–13)
NEUTS SEG # BLD: 2.5 K/UL (ref 1.8–8)
NEUTS SEG NFR BLD: 49 % (ref 32–75)
NRBC # BLD: 0 K/UL (ref 0–0.01)
NRBC BLD-RTO: 0 PER 100 WBC
PLATELET # BLD AUTO: 211 K/UL (ref 150–400)
PMV BLD AUTO: 9.2 FL (ref 8.9–12.9)
POTASSIUM SERPL-SCNC: 3.8 MMOL/L (ref 3.5–5.1)
PROT SERPL-MCNC: 7.4 G/DL (ref 6.4–8.2)
RBC # BLD AUTO: 4.72 M/UL (ref 4.1–5.7)
SARS-COV-2, COV2: DETECTED
SODIUM SERPL-SCNC: 139 MMOL/L (ref 136–145)
TROPONIN-HIGH SENSITIVITY: 4 NG/L (ref 0–76)
WBC # BLD AUTO: 4.9 K/UL (ref 4.1–11.1)

## 2021-12-30 PROCEDURE — 84484 ASSAY OF TROPONIN QUANT: CPT

## 2021-12-30 PROCEDURE — G0378 HOSPITAL OBSERVATION PER HR: HCPCS

## 2021-12-30 PROCEDURE — 70450 CT HEAD/BRAIN W/O DYE: CPT

## 2021-12-30 PROCEDURE — 87636 SARSCOV2 & INF A&B AMP PRB: CPT

## 2021-12-30 PROCEDURE — 93005 ELECTROCARDIOGRAM TRACING: CPT

## 2021-12-30 PROCEDURE — 99284 EMERGENCY DEPT VISIT MOD MDM: CPT

## 2021-12-30 PROCEDURE — 85025 COMPLETE CBC W/AUTO DIFF WBC: CPT

## 2021-12-30 PROCEDURE — 80053 COMPREHEN METABOLIC PANEL: CPT

## 2021-12-30 PROCEDURE — 71046 X-RAY EXAM CHEST 2 VIEWS: CPT

## 2021-12-30 PROCEDURE — 36415 COLL VENOUS BLD VENIPUNCTURE: CPT

## 2021-12-30 RX ORDER — ONDANSETRON 2 MG/ML
4 INJECTION INTRAMUSCULAR; INTRAVENOUS
Status: DISCONTINUED | OUTPATIENT
Start: 2021-12-30 | End: 2021-12-31 | Stop reason: HOSPADM

## 2021-12-30 RX ORDER — ONDANSETRON 4 MG/1
4 TABLET, ORALLY DISINTEGRATING ORAL
Status: DISCONTINUED | OUTPATIENT
Start: 2021-12-30 | End: 2021-12-31 | Stop reason: HOSPADM

## 2021-12-30 RX ORDER — ACETAMINOPHEN 650 MG/1
650 SUPPOSITORY RECTAL
Status: DISCONTINUED | OUTPATIENT
Start: 2021-12-30 | End: 2021-12-31 | Stop reason: HOSPADM

## 2021-12-30 RX ORDER — SODIUM CHLORIDE 0.9 % (FLUSH) 0.9 %
5-40 SYRINGE (ML) INJECTION AS NEEDED
Status: DISCONTINUED | OUTPATIENT
Start: 2021-12-30 | End: 2021-12-31 | Stop reason: HOSPADM

## 2021-12-30 RX ORDER — ENOXAPARIN SODIUM 100 MG/ML
40 INJECTION SUBCUTANEOUS DAILY
Status: DISCONTINUED | OUTPATIENT
Start: 2021-12-31 | End: 2021-12-31 | Stop reason: HOSPADM

## 2021-12-30 RX ORDER — POLYETHYLENE GLYCOL 3350 17 G/17G
17 POWDER, FOR SOLUTION ORAL DAILY PRN
Status: DISCONTINUED | OUTPATIENT
Start: 2021-12-30 | End: 2021-12-31 | Stop reason: HOSPADM

## 2021-12-30 RX ORDER — SODIUM CHLORIDE 0.9 % (FLUSH) 0.9 %
5-40 SYRINGE (ML) INJECTION EVERY 8 HOURS
Status: DISCONTINUED | OUTPATIENT
Start: 2021-12-30 | End: 2021-12-31 | Stop reason: HOSPADM

## 2021-12-30 RX ORDER — ACETAMINOPHEN 325 MG/1
650 TABLET ORAL
Status: DISCONTINUED | OUTPATIENT
Start: 2021-12-30 | End: 2021-12-31 | Stop reason: HOSPADM

## 2021-12-31 VITALS
TEMPERATURE: 98.7 F | HEIGHT: 69 IN | SYSTOLIC BLOOD PRESSURE: 135 MMHG | OXYGEN SATURATION: 100 % | WEIGHT: 240 LBS | RESPIRATION RATE: 17 BRPM | HEART RATE: 64 BPM | DIASTOLIC BLOOD PRESSURE: 87 MMHG | BODY MASS INDEX: 35.55 KG/M2

## 2021-12-31 LAB
ANION GAP SERPL CALC-SCNC: 9 MMOL/L (ref 5–15)
BUN SERPL-MCNC: 9 MG/DL (ref 6–20)
BUN/CREAT SERPL: 9 (ref 12–20)
CALCIUM SERPL-MCNC: 8.4 MG/DL (ref 8.5–10.1)
CHLORIDE SERPL-SCNC: 105 MMOL/L (ref 97–108)
CO2 SERPL-SCNC: 27 MMOL/L (ref 21–32)
CREAT SERPL-MCNC: 1.04 MG/DL (ref 0.7–1.3)
ERYTHROCYTE [DISTWIDTH] IN BLOOD BY AUTOMATED COUNT: 13.7 % (ref 11.5–14.5)
GLUCOSE SERPL-MCNC: 128 MG/DL (ref 65–100)
HCT VFR BLD AUTO: 42.5 % (ref 36.6–50.3)
HGB BLD-MCNC: 12.7 G/DL (ref 12.1–17)
MAGNESIUM SERPL-MCNC: 2.4 MG/DL (ref 1.6–2.4)
MCH RBC QN AUTO: 25.8 PG (ref 26–34)
MCHC RBC AUTO-ENTMCNC: 29.9 G/DL (ref 30–36.5)
MCV RBC AUTO: 86.2 FL (ref 80–99)
NRBC # BLD: 0 K/UL (ref 0–0.01)
NRBC BLD-RTO: 0 PER 100 WBC
PLATELET # BLD AUTO: 209 K/UL (ref 150–400)
PMV BLD AUTO: 9.5 FL (ref 8.9–12.9)
POTASSIUM SERPL-SCNC: 3.9 MMOL/L (ref 3.5–5.1)
RBC # BLD AUTO: 4.93 M/UL (ref 4.1–5.7)
SODIUM SERPL-SCNC: 141 MMOL/L (ref 136–145)
TROPONIN-HIGH SENSITIVITY: 5 NG/L (ref 0–76)
TROPONIN-HIGH SENSITIVITY: 5 NG/L (ref 0–76)
WBC # BLD AUTO: 5.8 K/UL (ref 4.1–11.1)

## 2021-12-31 PROCEDURE — 84484 ASSAY OF TROPONIN QUANT: CPT

## 2021-12-31 PROCEDURE — 80048 BASIC METABOLIC PNL TOTAL CA: CPT

## 2021-12-31 PROCEDURE — 85027 COMPLETE CBC AUTOMATED: CPT

## 2021-12-31 PROCEDURE — 96372 THER/PROPH/DIAG INJ SC/IM: CPT

## 2021-12-31 PROCEDURE — 83735 ASSAY OF MAGNESIUM: CPT

## 2021-12-31 PROCEDURE — 74011250636 HC RX REV CODE- 250/636: Performed by: HOSPITALIST

## 2021-12-31 PROCEDURE — 36415 COLL VENOUS BLD VENIPUNCTURE: CPT

## 2021-12-31 PROCEDURE — G0378 HOSPITAL OBSERVATION PER HR: HCPCS

## 2021-12-31 RX ORDER — ZINC SULFATE 50(220)MG
1 CAPSULE ORAL DAILY
Qty: 10 CAPSULE | Refills: 0 | Status: SHIPPED | OUTPATIENT
Start: 2021-12-31 | End: 2022-01-24

## 2021-12-31 RX ORDER — ASCORBIC ACID 500 MG
500 TABLET ORAL 2 TIMES DAILY
Qty: 20 TABLET | Refills: 0 | Status: SHIPPED | OUTPATIENT
Start: 2021-12-31 | End: 2022-01-24

## 2021-12-31 RX ORDER — GUAIFENESIN DEXTROMETHORPHAN HYDROBROMIDE ORAL SOLUTION 10; 100 MG/5ML; MG/5ML
10 SOLUTION ORAL
Qty: 236 EACH | Refills: 0 | Status: SHIPPED | OUTPATIENT
Start: 2021-12-31 | End: 2022-01-24

## 2021-12-31 RX ADMIN — Medication 10 ML: at 01:15

## 2021-12-31 RX ADMIN — ENOXAPARIN SODIUM 40 MG: 100 INJECTION SUBCUTANEOUS at 10:18

## 2021-12-31 NOTE — PROGRESS NOTES
**Physician Signature**  This document was electronically signed by: Melody Epps MD  12/30/2021 09:53 PM    **Consult Information**  Member Facility: [address] [place] [address] Rd., [address] MRN: [id number]  Consult ID: [id number]  Facility Time Zone: ET  Date and Time of Request: 12/30/2021 09:39:47 PM  ET  Requesting Clinician: Sonia Naranjo NP  Patient Name: Florecita Campbell  Date of Birth: [birth year]  Gender: Male    **Reason for Consult**  Reason for Consult: Admit: non-ICU    **Clinical Note**  Clinical Note: 62 y/o male p/w syncope. Admit orders placed. **Attestation**  Interaction Mode: Phone Only  Phone Duration (mins): 2  Time of Call : 12/30/2021 09:48 PM  ET  Interaction Attestation: Interprofessional internet consultation was delivered through telephonic and/or electronic communication upon the request of the patients treating provider, while the requesting and the rendering provider were not in the same physical location. A written summary report was provided to the requesting provider at the originating site.   Evaluation Duration (mins): 5    **Physician Signature**  This document was electronically signed by: Melody Epps MD  12/30/2021 09:53 PM

## 2021-12-31 NOTE — PROGRESS NOTES
Problem: Airway Clearance - Ineffective  Goal: Achieve or maintain patent airway  Outcome: Resolved/Met     Problem: Gas Exchange - Impaired  Goal: Absence of hypoxia  Outcome: Resolved/Met  Goal: Promote optimal lung function  Outcome: Resolved/Met     Problem: Breathing Pattern - Ineffective  Goal: Ability to achieve and maintain a regular respiratory rate  Outcome: Resolved/Met     Problem:  Body Temperature -  Risk of, Imbalanced  Goal: Ability to maintain a body temperature within defined limits  Outcome: Resolved/Met  Goal: Will regain or maintain usual level of consciousness  Outcome: Resolved/Met  Goal: Complications related to the disease process, condition or treatment will be avoided or minimized  Outcome: Resolved/Met     Problem: Isolation Precautions - Risk of Spread of Infection  Goal: Prevent transmission of infectious organism to others  Outcome: Resolved/Met     Problem: Nutrition Deficits  Goal: Optimize nutrtional status  Outcome: Resolved/Met     Problem: Risk for Fluid Volume Deficit  Goal: Maintain normal heart rhythm  Outcome: Resolved/Met  Goal: Maintain absence of muscle cramping  Outcome: Resolved/Met  Goal: Maintain normal serum potassium, sodium, calcium, phosphorus, and pH  Outcome: Resolved/Met     Problem: Loneliness or Risk for Loneliness  Goal: Demonstrate positive use of time alone when socialization is not possible  Outcome: Resolved/Met     Problem: Fatigue  Goal: Verbalize increase energy and improved vitality  Outcome: Resolved/Met     Problem: Patient Education: Go to Patient Education Activity  Goal: Patient/Family Education  Outcome: Resolved/Met

## 2021-12-31 NOTE — PROGRESS NOTES
137 [facility] Admission Pharmacy Medication Reconciliation     Information obtained from: Patient  RxQuery data available1: No    Comments/recommendations:    Telephone interviewed patient regarding his PTA medication list and drug allergies. Patient was questioned regarding use of any inhalers, topical products, OTC/herbal/vitamin products or ophthalmic/nasal/otic medication use. Medication changes (since last review): Added: None  Removed: None  Adjusted: None   1RxQuery pharmacy benefit data reflects medications filled and processed through the patient's insurance, however, this data does NOT capture whether the medication was picked up or is currently being taken by the patient. Total Time Spent: 10 minutes    Past Medical History/Disease States:  Past Medical History:   Diagnosis Date    Seizures ([facility] [address]     seizure as a child         Patient allergies: Allergies as of 12/30/2021 - Fully Reviewed 12/30/2021   Allergen Reaction Noted    Egg derived Nausea and Vomiting 04/24/2019       PRIOR TO ADMISSION MEDICATIONS:  None          Thank you,  Trish Turcios, Pharm. D., [facility]  [phone]

## 2021-12-31 NOTE — PROGRESS NOTES
1235) TRANSFER - IN REPORT:    Verbal report received from [facility] (name) on Adolph Wright  being received from ED (unit) for routine progression of care      Report consisted of patients Situation, Background, Assessment and   Recommendations(SBAR). Information from the following report(s) SBAR, Kardex, Intake/Output, MAR and Recent Results was reviewed with the receiving nurse. Opportunity for questions and clarification was provided. Assessment completed upon patients arrival to unit and care assumed. 5006) Pt arrived on the unit. Vital signs stable. Pt placed on tele box #6 and running NSR. Pt refused assessment of buttocks but allowed for dual skin of everything else, competed with Dylon Jackson RN. Labs drawn and sent down. Admission database completed. Pt left resting in bed at this time. 0215) Pt resting in room at this time. Pt gave this writer car keys, keys taken down and left with  for pt's son to . Pt stated no additional needs at this time and left resting in bed    0235) Critical lab obtained for this pt. Troponin of 5 at this time, pt denies chest pain or SOB. Tele doc consulted and no orders at this time. Will continue to assess. 0445) Pt reassessed and no changes to note. Vital signs and labs obtained by Hillary. Pt stated no additional needs at this time. 4551) Pt asleep in bed at this time. Flush missed. RR 14.    0700) Bedside shift change report given to Tori King RN (oncoming nurse) by Krista Verdugo RN (offgoing nurse). Report included the following information SBAR, Kardex, Intake/Output, MAR, Recent Results and Cardiac Rhythm NSR.

## 2021-12-31 NOTE — H&P
CC: syncope    HPI: 62 y/o male pmhx of childhood seizures and tobacco abuse p/w 2 syncopal episodes. Patient states that he had a coughing spell earlier at home followed by a syncopal episode. He denies any headaches, dizziness or chest pain preceding the episode. He then had a similar coughing spell in the afternoon at work followed by a syncopal episode. He again did not have any headaches, dizziness or chest pain preceding the episode. He denies biting his tongue or any urinary incontinence. He denies any fevers but admits to a recent cough. He denies any change in his diet, sleep or energy recently. He states that he has had similar syncopal episodes in the past, specifically about 2 years ago that required hospitalization. 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 He states that he is vaccinated for COVID. He has been stable in the ED. His EKG shows NSR and his first Troponin is negative. His lab work and CT Head are unremarkable. PMHx:  Childhood seizures  Tobacco abuse    Surgical Hx:  Cyst removed from shoulder    Social Hx:  Smokes cigarettes  Drinks ETOH socially    Family Hx:  Denies any family history    Allergies:  NKDA    ROS: All systems reviewed and found to be negative except as per HPI. Labs: Troponin 4    Imaging:  CT Head:    1). No evidence of acute intracranial abnormality  2). Unchanged left cerebellar hypoplasia    EKG: NSR     Vitals: Afebrile, /89, HR 72, RR 24, 100% on RA    Exam:  Gen: NAD  HEENT: PERRLA  CVS: S1 and S2 audible, no murmurs  Lungs: Clear to auscultation bilaterally, no murmurs  Abd: Soft and non-tender to palpation, + bowel sounds  Extrem: Trace LE edema  Neuro: Alert to person, place and time, No CN deficits    A/P:    1) Syncope: P/w 2 syncopal episodes earlier today precipitated by coughing. Patient is hemodynamically stable and neurologically intact with unremarkable lab work, EKG and CT Head. Suspect vasovagal syncope induced by cough. Will admit for observation with telemetry and trend enzymes. 2decho has been ordered as well.     2) DVT prophylaxis: w/Lovenox SQ

## 2021-12-31 NOTE — PROGRESS NOTES
Transition of Care Plan:  RUR: TBD     * Disposition- Home  * Transportation at Discharge:   * IM/MOONS/OBS/FOC letter: OBS notification   * Appointment with PCP office close due to holiday  * CM please follow-up on: follow-up appointment      Additional Resources:  [address]ina Metz [phone]      CM reviewed chart, unable to speak with patient at this time waiting on staff to bring phone to patient. PCP office is close due to holiday. [employer] on-going. 1216 CM completed assessment with pt via phone due to Droplet precautions . Pt is alert and oriented throughout encounter. CM introduced self/role, verified demographics, and discussed discharge planning patient lives alone will be able to quarantine states he has transportation home use [employer] on [facility] and [place]. Discuss OBS verbal understanding copy given to RN to give to patient. Patient to schedule appointment on Monday after the holiday. RN patient will need a work note. Patient ready for discharge from CM standpoint.     [address] [facility], [address] [phone]

## 2021-12-31 NOTE — ED NOTES
TRANSFER - OUT REPORT:    Verbal report given to Sierra on Chris Liat  being transferred to [facility] for routine progression of care       Report consisted of patients Situation, Background, Assessment and   Recommendations(SBAR). Information from the following report(s) SBAR, Kardex and ED Summary was reviewed with the receiving nurse. Lines:   Peripheral IV 12/30/21 Right Antecubital (Active)   Site Assessment Clean, dry, & intact 12/30/21 2026   Phlebitis Assessment 0 12/30/21 2026   Infiltration Assessment 4 12/30/21 2026   Dressing Status Clean, dry, & intact 12/30/21 2026   Dressing Type Tape;Transparent 12/30/21 2026   Hub Color/Line Status Flushed 12/30/21 2026   Action Taken Blood drawn 12/30/21 2026   Alcohol Cap Used Yes 12/30/21 2026        Opportunity for questions and clarification was provided.       Patient transported with:   [employer]

## 2021-12-31 NOTE — PROGRESS NOTES
Tele cross cover     Troponin is 5    Plan case was discussed with the nurse patient denies any chest pain , patient was admitted for syncope

## 2021-12-31 NOTE — PROGRESS NOTES
Problem: Airway Clearance - Ineffective  Goal: Achieve or maintain patent airway  Outcome: Progressing Towards Goal     Problem: Gas Exchange - Impaired  Goal: Absence of hypoxia  Outcome: Progressing Towards Goal     Problem: Breathing Pattern - Ineffective  Goal: Ability to achieve and maintain a regular respiratory rate  Outcome: Progressing Towards Goal     Problem: Isolation Precautions - Risk of Spread of Infection  Goal: Prevent transmission of infectious organism to others  Outcome: Progressing Towards Goal     Problem: Fatigue  Goal: Verbalize increase energy and improved vitality  Outcome: Progressing Towards Goal

## 2021-12-31 NOTE — ED NOTES
Pt arrives ambulatory with C/O passing out twice today. Pt reports he passed out this morning after coughing and the same thing happened while at work this afternoon. Pt reports he passed out while driving two years ago, he was not coughing at that time. Pt reports he was seen by a cardiologist and had tests run just prior to [facility] [place], but due to [place] pt states he was unable to follow up and that did not receive any information about the tests that were run. Pt denies chest pain, shortness of breath, fevers, urinary symptoms. Pt is alert and oriented x 4. RR even and unlabored. Pt updated on plan of care and has no questions or concerns at this time. Call bell is within reach. Emergency Department Nursing Plan of Care       The Nursing Plan of Care is developed from the Nursing assessment and Emergency Department Attending provider initial evaluation. The plan of care may be reviewed in the ED Provider note.     The Plan of Care was developed with the following considerations:   Patient / Family readiness to learn indicated by:verbalized understanding  Persons(s) to be included in education: patient  Barriers to Learning/Limitations:No    Signed     [place] SHAYY Corea    12/30/2021   8:10 PM

## 2021-12-31 NOTE — ED PROVIDER NOTES
EMERGENCY DEPARTMENT HISTORY AND PHYSICAL EXAM    Date: 12/30/2021  Patient Name: Caitlin Menendez    History of Presenting Illness     Chief Complaint   Patient presents with    Syncope     passed out twice today while at home and work, +chest pain, denies sob/headache and dizziness. Pt is alert and oriented x 4, speech is clear, no acute distress noted. Pt reports driving to ED tonight. History Provided By: Patient    Chief Complaint:syncope  Duration: onset today   Timing:  Acute  Quality: states he blacked out  Severity: Severe  Modifying Factors: none  Associated Symptoms: cough      HPI: Caitlin Menendez is a 61 y.o. male with a PMH of seizure in childhood who presents with spell cute onset today. Patient states he passed out twice today patient states he passed out once at home when he started to cough. And then went to work and had a coughing spell and passed out again. Patient states he has had previous episodes of these passing out spells and would like to know what is wrong with him. On chart review noted patient previously was admitted for syncope. Patient denies visual disturbance he denies numbness or tingling. He denies difficulty walking. He endorses cough and nasal congestion. 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 PCP: Oanh Alvarez MD        Past History     Past Medical History:  Past Medical History:   Diagnosis Date    Seizures ([address]     seizure as a child       Past Surgical History:  Past Surgical History:   Procedure Laterality Date    HX OTHER SURGICAL      cyst removed from right shoulder       Family History:  History reviewed. No pertinent family history. Social History:  Social History     Tobacco Use    Smoking status: Never Smoker    Smokeless tobacco: Never Used   Vaping Use    Vaping Use: Never used   Substance Use Topics    Alcohol use: No     Comment: socially    Drug use:  No Allergies: Allergies   Allergen Reactions    Egg Derived Nausea and Vomiting         Review of Systems   Review of Systems   Constitutional: Negative for chills, fatigue and fever. HENT: Positive for congestion. Negative for sore throat. Eyes: Negative for redness. Respiratory: Positive for cough. Negative for chest tightness and wheezing. Cardiovascular: Negative for chest pain. Gastrointestinal: Negative for abdominal pain. Genitourinary: Negative for dysuria. Musculoskeletal: Negative for arthralgias, back pain, myalgias, neck pain and neck stiffness. Skin: Negative for rash. Neurological: Positive for syncope. Negative for dizziness, weakness, light-headedness, numbness and headaches. Hematological: Negative for adenopathy. All other systems reviewed and are negative. Physical Exam     Vitals:    12/30/21 1857   BP: 137/82   Pulse: 90   Resp: 19   Temp: 98.4 °F (36.9 °C)   SpO2: 99%   Weight: 108.9 kg (240 lb)   Height: 5' 9\" (1.753 m)     Physical Exam  Vitals and nursing note reviewed. Constitutional:       Appearance: He is well-developed. HENT:      Head: Normocephalic and atraumatic. Right Ear: External ear normal.      Nose: Congestion present. Eyes:      General:         Right eye: No discharge. Left eye: No discharge. Conjunctiva/sclera: Conjunctivae normal.   Cardiovascular:      Rate and Rhythm: Normal rate and regular rhythm. Heart sounds: Normal heart sounds. Pulmonary:      Effort: Pulmonary effort is normal. No respiratory distress. Breath sounds: Normal breath sounds. No wheezing. Abdominal:      General: Bowel sounds are normal.      Palpations: Abdomen is soft. Tenderness: There is no abdominal tenderness. Musculoskeletal:         General: Normal range of motion. Cervical back: Normal range of motion and neck supple. Lymphadenopathy:      Cervical: No cervical adenopathy.    Skin:     General: Skin is warm and dry. Neurological:      General: No focal deficit present. Mental Status: He is alert and oriented to person, place, and time. Sensory: No sensory deficit. Motor: No weakness. Gait: Gait normal.   Psychiatric:         Behavior: Behavior normal.         Thought Content: Thought content normal.         Judgment: Judgment normal.           Diagnostic Study Results     Labs -     Recent Results (from the past 12 hour(s))   EKG, 12 LEAD, INITIAL    Collection Time: 12/30/21  7:46 PM   Result Value Ref Range    Ventricular Rate 76 BPM    Atrial Rate 76 BPM    P-R Interval 196 ms    QRS Duration 86 ms    Q-T Interval 382 ms    QTC Calculation (Bezet) 429 ms    Calculated P Axis 58 degrees    Calculated R Axis 35 degrees    Calculated T Axis 49 degrees    Diagnosis       Normal sinus rhythm  Normal ECG  When compared with ECG of 16-MAR-2019 16:43,  No significant change was found     CBC WITH AUTOMATED DIFF    Collection Time: 12/30/21  8:21 PM   Result Value Ref Range    WBC 4.9 4.1 - 11.1 K/uL    RBC 4.72 4.10 - 5.70 M/uL    HGB 12.5 12.1 - 17.0 g/dL    HCT 40.9 36.6 - 50.3 %    MCV 86.7 80.0 - 99.0 FL    MCH 26.5 26.0 - 34.0 PG    MCHC 30.6 30.0 - 36.5 g/dL    RDW 13.8 11.5 - 14.5 %    PLATELET 261 413 - 407 K/uL    MPV 9.2 8.9 - 12.9 FL    NRBC 0.0 0  WBC    ABSOLUTE NRBC 0.00 0.00 - 0.01 K/uL    NEUTROPHILS 49 32 - 75 %    LYMPHOCYTES 33 12 - 49 %    MONOCYTES 10 5 - 13 %    EOSINOPHILS 7 0 - 7 %    BASOPHILS 1 0 - 1 %    IMMATURE GRANULOCYTES 0 0.0 - 0.5 %    ABS. NEUTROPHILS 2.5 1.8 - 8.0 K/UL    ABS. LYMPHOCYTES 1.6 0.8 - 3.5 K/UL    ABS. MONOCYTES 0.5 0.0 - 1.0 K/UL    ABS. EOSINOPHILS 0.3 0.0 - 0.4 K/UL    ABS. BASOPHILS 0.0 0.0 - 0.1 K/UL    ABS. IMM.  GRANS. 0.0 0.00 - 0.04 K/UL    DF AUTOMATED     METABOLIC PANEL, COMPREHENSIVE    Collection Time: 12/30/21  8:21 PM   Result Value Ref Range    Sodium 139 136 - 145 mmol/L    Potassium 3.8 3.5 - 5.1 mmol/L    Chloride 103 97 - 108 mmol/L    CO2 30 21 - 32 mmol/L    Anion gap 6 5 - 15 mmol/L    Glucose 123 (H) 65 - 100 mg/dL    BUN 8 6 - 20 MG/DL    Creatinine 1.19 0.70 - 1.30 MG/DL    BUN/Creatinine ratio 7 (L) 12 - 20      GFR est AA >60 >60 ml/min/1.73m2    GFR est non-AA >60 >60 ml/min/1.73m2    Calcium 8.5 8.5 - 10.1 MG/DL    Bilirubin, total 0.4 0.2 - 1.0 MG/DL    ALT (SGPT) 37 12 - 78 U/L    AST (SGOT) 22 15 - 37 U/L    Alk. phosphatase 98 45 - 117 U/L    Protein, total 7.4 6.4 - 8.2 g/dL    Albumin 3.6 3.5 - 5.0 g/dL    Globulin 3.8 2.0 - 4.0 g/dL    A-G Ratio 0.9 (L) 1.1 - 2.2         Radiologic Studies -   CT HEAD WO CONT   Final Result   1. No evidence of acute intracranial abnormality. 2. Unchanged left cerebellar hypoplasia. XR CHEST PA LAT   Final Result   No acute cardiopulmonary process. CT Results  (Last 48 hours)               12/30/21 2010  CT HEAD WO CONT Final result    Impression:  1. No evidence of acute intracranial abnormality. 2. Unchanged left cerebellar hypoplasia. Narrative:  EXAM:  CT HEAD WO CONT       INDICATION:   syncope       COMPARISON: CT head 3/16/2019. TECHNIQUE: Unenhanced CT of the head was performed using 5 mm images. Brain and   bone windows were generated. CT dose reduction was achieved through use of a   standardized protocol tailored for this examination and automatic exposure   control for dose modulation. FINDINGS:   The ventricles are normal in size and position. There is unchanged left   cerebellar hypoplasia. Basilar cisterns are patent. No midline shift. There is   no evidence of acute infarct, hemorrhage, or extraaxial fluid collection. Unchanged small calcifications in the right temporo-occipital lobe. Scattered mucosal thickening in the bilateral ethmoidal air cells and maxillary   sinuses without air-fluid level. The mastoid air cells and middle ears are   clear. The orbital contents are within normal limits.   There are no significant osseous or extracranial soft tissue lesions. CXR Results  (Last 48 hours)               12/30/21 1910  XR CHEST PA LAT Final result    Impression:  No acute cardiopulmonary process. Narrative:  EXAM:  XR CHEST PA LAT       INDICATION:   chest pain       COMPARISON: Chest radiograph 1/14/2013. FINDINGS: PA and lateral radiographs of the chest were obtained. No evidence of focal consolidation. No pleural effusion or pneumothorax. Heart,   nikita, mediastinum are within normal limits. No acute osseous abnormalities. Medical Decision Making   I am the first provider for this patient. I reviewed the vital signs, available nursing notes, past medical history, past surgical history, family history and social history. Vital Signs-Reviewed the patient's vital signs. Records Reviewed: Nursing Notes    Provider Notes (Medical Decision Making):   48-year-old patient presents with 2 episodes of \"passing out\". Will order EKG labs CT head. DDX syncope near syncope TIA CVA        Patient is being admitted to the hospital.  The results of their tests and reasons for their admission have been discussed with them and/or available family. They convey agreement and understanding for the need to be admitted and for their admission diagnosis. Consultation has been made with the inpatient physician specialist for hospitalization.     LABORATORY TESTS:  Recent Results (from the past 12 hour(s))   EKG, 12 LEAD, INITIAL    Collection Time: 12/30/21  7:46 PM   Result Value Ref Range    Ventricular Rate 76 BPM    Atrial Rate 76 BPM    P-R Interval 196 ms    QRS Duration 86 ms    Q-T Interval 382 ms    QTC Calculation (Bezet) 429 ms    Calculated P Axis 58 degrees    Calculated R Axis 35 degrees    Calculated T Axis 49 degrees    Diagnosis       Normal sinus rhythm  Normal ECG  When compared with ECG of 16-MAR-2019 16:43,  No significant change was found     CBC WITH AUTOMATED DIFF Collection Time: 12/30/21  8:21 PM   Result Value Ref Range    WBC 4.9 4.1 - 11.1 K/uL    RBC 4.72 4.10 - 5.70 M/uL    HGB 12.5 12.1 - 17.0 g/dL    HCT 40.9 36.6 - 50.3 %    MCV 86.7 80.0 - 99.0 FL    MCH 26.5 26.0 - 34.0 PG    MCHC 30.6 30.0 - 36.5 g/dL    RDW 13.8 11.5 - 14.5 %    PLATELET 692 745 - 938 K/uL    MPV 9.2 8.9 - 12.9 FL    NRBC 0.0 0  WBC    ABSOLUTE NRBC 0.00 0.00 - 0.01 K/uL    NEUTROPHILS 49 32 - 75 %    LYMPHOCYTES 33 12 - 49 %    MONOCYTES 10 5 - 13 %    EOSINOPHILS 7 0 - 7 %    BASOPHILS 1 0 - 1 %    IMMATURE GRANULOCYTES 0 0.0 - 0.5 %    ABS. NEUTROPHILS 2.5 1.8 - 8.0 K/UL    ABS. LYMPHOCYTES 1.6 0.8 - 3.5 K/UL    ABS. MONOCYTES 0.5 0.0 - 1.0 K/UL    ABS. EOSINOPHILS 0.3 0.0 - 0.4 K/UL    ABS. BASOPHILS 0.0 0.0 - 0.1 K/UL    ABS. IMM. GRANS. 0.0 0.00 - 0.04 K/UL    DF AUTOMATED     METABOLIC PANEL, COMPREHENSIVE    Collection Time: 12/30/21  8:21 PM   Result Value Ref Range    Sodium 139 136 - 145 mmol/L    Potassium 3.8 3.5 - 5.1 mmol/L    Chloride 103 97 - 108 mmol/L    CO2 30 21 - 32 mmol/L    Anion gap 6 5 - 15 mmol/L    Glucose 123 (H) 65 - 100 mg/dL    BUN 8 6 - 20 MG/DL    Creatinine 1.19 0.70 - 1.30 MG/DL    BUN/Creatinine ratio 7 (L) 12 - 20      GFR est AA >60 >60 ml/min/1.73m2    GFR est non-AA >60 >60 ml/min/1.73m2    Calcium 8.5 8.5 - 10.1 MG/DL    Bilirubin, total 0.4 0.2 - 1.0 MG/DL    ALT (SGPT) 37 12 - 78 U/L    AST (SGOT) 22 15 - 37 U/L    Alk. phosphatase 98 45 - 117 U/L    Protein, total 7.4 6.4 - 8.2 g/dL    Albumin 3.6 3.5 - 5.0 g/dL    Globulin 3.8 2.0 - 4.0 g/dL    A-G Ratio 0.9 (L) 1.1 - 2.2         IMAGING RESULTS:  CT HEAD WO CONT   Final Result   1. No evidence of acute intracranial abnormality. 2. Unchanged left cerebellar hypoplasia. XR CHEST PA LAT   Final Result   No acute cardiopulmonary process. XR CHEST PA LAT    Result Date: 12/30/2021  EXAM:  XR CHEST PA LAT INDICATION:   chest pain COMPARISON: Chest radiograph 1/14/2013. FINDINGS: PA and lateral radiographs of the chest were obtained. No evidence of focal consolidation. No pleural effusion or pneumothorax. Heart, nikita, mediastinum are within normal limits. No acute osseous abnormalities. No acute cardiopulmonary process. CT HEAD WO CONT    Result Date: 12/30/2021  EXAM:  CT HEAD WO CONT INDICATION:   syncope COMPARISON: CT head 3/16/2019. TECHNIQUE: Unenhanced CT of the head was performed using 5 mm images. Brain and bone windows were generated. CT dose reduction was achieved through use of a standardized protocol tailored for this examination and automatic exposure control for dose modulation. FINDINGS: The ventricles are normal in size and position. There is unchanged left cerebellar hypoplasia. Basilar cisterns are patent. No midline shift. There is no evidence of acute infarct, hemorrhage, or extraaxial fluid collection. Unchanged small calcifications in the right temporo-occipital lobe. Scattered mucosal thickening in the bilateral ethmoidal air cells and maxillary sinuses without air-fluid level. The mastoid air cells and middle ears are clear. The orbital contents are within normal limits. There are no significant osseous or extracranial soft tissue lesions. 1. No evidence of acute intracranial abnormality. 2. Unchanged left cerebellar hypoplasia.        MEDICATIONS GIVEN:  Medications   sodium chloride (NS) flush 5-40 mL (has no administration in time range)   sodium chloride (NS) flush 5-40 mL (has no administration in time range)   acetaminophen (TYLENOL) tablet 650 mg (has no administration in time range)     Or   acetaminophen (TYLENOL) suppository 650 mg (has no administration in time range)   polyethylene glycol (MIRALAX) packet 17 g (has no administration in time range)   ondansetron (ZOFRAN ODT) tablet 4 mg (has no administration in time range)     Or   ondansetron (ZOFRAN) injection 4 mg (has no administration in time range)   enoxaparin (LOVENOX) injection 40 mg (has no administration in time range)       IMPRESSION:  1. Syncope, unspecified syncope type        PLAN:  1. Admit to Dr Daljit García               Procedures:  Procedures    Please note that this dictation was completed with Dragon, computer voice recognition software. Quite often unanticipated grammatical, syntax, homophones, and other interpretive errors are inadvertently transcribed by the computer software. Please disregard these errors. Additionally, please excuse any errors that have escaped final proofreading. Diagnosis     Clinical Impression:   1.  Syncope, unspecified syncope type

## 2021-12-31 NOTE — PROGRESS NOTES
[address] [facility].       12/31/2021      RE: Radu Hurst      To Whom it May Concern: This is to certify that Radu Hurst was admitted to hospital on 12/30/2021   to 12/31/2021. He must self isolate until 01/07/2021. He may return to work on 01/08/2021. Thank you for your assistance in this matter.     Sincerely,      Emmett Jhaveri MD

## 2021-12-31 NOTE — PROGRESS NOTES
1240: Reviewed discharge instructions with pt including follow-up appointments, new medications and side effects, medications to continue, medications to discontinue, Covid education, signs/symptoms of stroke and heart attack, and MyChart information. Pt expressed understanding. IV was removed. Belongings sent home with pt.

## 2021-12-31 NOTE — PROGRESS NOTES
**Physician Signature**  This document was electronically signed by: Chanel Farias MD  12/30/2021 11:13 PM    **Consult Information**  Member Facility: [address] [place] [address] MRN: [id number]  Consult ID: [id number]  Facility Time Zone: ET  Date and Time of Request: 12/30/2021 11:00:24 PM  ET  Requesting Clinician: Teo Hall RN  Patient Name: Cassie Krueger  Date of Birth: [birth year]  Gender: Female    **Reason for Consult**  Reason for Consult: Abnormal Lab    **Clinical Note**  Clinical Note: Platelet count is down to 87,000, recommend holding SQ Heparin for now. **Attestation**  Interaction Mode: Phone Only  Phone Duration (mins): 1  Time of Call : 12/30/2021 11:12 PM  ET  Interaction Attestation: Interprofessional internet consultation was delivered through telephonic and/or electronic communication upon the request of the patients treating provider, while the requesting and the rendering provider were not in the same physical location. A written summary report was provided to the requesting provider at the originating site.   Evaluation Duration (mins): 1    **Physician Signature**  This document was electronically signed by: Chanel Farias MD  12/30/2021 11:13 PM

## 2022-01-01 LAB
ATRIAL RATE: 76 BPM
CALCULATED P AXIS, ECG09: 58 DEGREES
CALCULATED R AXIS, ECG10: 35 DEGREES
CALCULATED T AXIS, ECG11: 49 DEGREES
DIAGNOSIS, 93000: NORMAL
P-R INTERVAL, ECG05: 196 MS
Q-T INTERVAL, ECG07: 382 MS
QRS DURATION, ECG06: 86 MS
QTC CALCULATION (BEZET), ECG08: 429 MS
VENTRICULAR RATE, ECG03: 76 BPM

## 2022-01-03 ENCOUNTER — TELEPHONE (OUTPATIENT)
Dept: CASE MANAGEMENT | Age: 57
End: 2022-01-03

## 2022-01-03 ENCOUNTER — PATIENT OUTREACH (OUTPATIENT)
Dept: CASE MANAGEMENT | Age: 57
End: 2022-01-03

## 2022-01-03 NOTE — TELEPHONE ENCOUNTER
Nurse Navigator was able to reach patient for IP discharge follow-up no additional is needed from [facility].     Braeden Diamond CM

## 2022-01-03 NOTE — PROGRESS NOTES
Patient contacted regarding COVID-19 diagnosis. Discussed COVID-19 related testing which was available at this time. Test results were positive. Patient informed of results, if available? yes. Care Transition Nurse contacted the patient by telephone to perform post discharge assessment. Call within 2 business days of discharge: Yes. Verified name and  with patient as identifiers. Provided introduction to self, and explanation of the CTN/ACM role, and reason for call due to risk factors for infection and/or exposure to COVID-19. Symptoms reviewed with patient who verbalized the following symptoms: cough, no new symptoms and no worsening symptoms      Due to no new or worsening symptoms encounter was not routed to provider for escalation. Discussed follow-up appointments. If no appointment was previously scheduled, appointment scheduling offered:  yes. [facility] follow up appointment(s): No future appointments. Patient states he will call his PCP office today to schedule [facility] appointment. Non-[facility] follow up appointment(s): None noted at this time. Interventions to address risk factors: Obtained and reviewed discharge summary and/or continuity of care documents and Education of patient/family/caregiver/guardian to support self-management-Education provided regarding signs/symptoms of Covid-19 virus, patient verbalized an understanding. Advance Care Planning:   Does patient have an Advance Directive: not on file; education provided. Educated patient about risk for severe COVID-19 due to risk factors according to CDC guidelines. CTN reviewed discharge instructions, medical action plan and red flag symptoms with the patient who verbalized understanding. Discussed COVID vaccination status: yes. Education provided on COVID-19 vaccination as appropriate. Discussed exposure protocols and quarantine with CDC Guidelines.  Patient was given an opportunity to verbalize any questions and concerns and agrees to contact CTN or health care provider for questions related to their healthcare. Reviewed and educated patient on any new and changed medications related to discharge diagnosis     Was patient discharged with a pulse oximeter? no     CTN provided contact information. Plan for follow-up in 10-14 days based on severity of symptoms and risk factors.

## 2022-01-24 ENCOUNTER — OFFICE VISIT (OUTPATIENT)
Dept: INTERNAL MEDICINE CLINIC | Age: 57
End: 2022-01-24
Payer: COMMERCIAL

## 2022-01-24 VITALS
TEMPERATURE: 98 F | DIASTOLIC BLOOD PRESSURE: 86 MMHG | BODY MASS INDEX: 37.03 KG/M2 | RESPIRATION RATE: 19 BRPM | HEIGHT: 69 IN | HEART RATE: 77 BPM | WEIGHT: 250 LBS | SYSTOLIC BLOOD PRESSURE: 136 MMHG | OXYGEN SATURATION: 98 %

## 2022-01-24 DIAGNOSIS — G45.0 VERTEBRAL-BASILAR ARTERY OCCLUSIVE SYNDROME: ICD-10-CM

## 2022-01-24 DIAGNOSIS — R55 SYNCOPE, UNSPECIFIED SYNCOPE TYPE: Primary | ICD-10-CM

## 2022-01-24 DIAGNOSIS — R94.30 LOW LEFT VENTRICULAR EJECTION FRACTION: ICD-10-CM

## 2022-01-24 DIAGNOSIS — E66.9 OBESITY (BMI 35.0-39.9 WITHOUT COMORBIDITY): ICD-10-CM

## 2022-01-24 DIAGNOSIS — E01.0 THYROMEGALY: ICD-10-CM

## 2022-01-24 PROCEDURE — 99214 OFFICE O/P EST MOD 30 MIN: CPT | Performed by: INTERNAL MEDICINE

## 2022-01-24 NOTE — PROGRESS NOTES
Chief Complaint   Patient presents with    Loss of Consciousness     12/27/21 loss of consciousnes witness at work      1. Have you been to the ER, urgent care clinic since your last visit? Hospitalized since your last visit? No    2. Have you seen or consulted any other health care providers outside of the [address] [place] [address] since your last visit? Include any pap smears or colon screening.  No

## 2022-01-25 NOTE — PROGRESS NOTES
Wanda Mejía is a 61 y.o. male and presents with Loss of Consciousness (21 loss of consciousnes witness at work )  . Subjective:      Last appt 2021  Pt did not f/u w vascular specialist for vertebral artery dz. Pt was eval in ED for another syncopal episode after coughing fit ~ 1 month ago. He was d/c'ed from ED w presumptive dx of vasovagal syncope. PMH-H/o childhood seizures   H/o syncope-pt reports he was hospitalized in 2019 he needs a \"stent in his heart\", but he had no insurance. Pt requests referral to f/u w that provider, but does not have the info. No consult in our system. I will refer him back to the vascular specialist he saw after hospitalization 2019 for vertebral artery dz. ?enlarged left thyroid on carotid duplex last year   Modestly low EF on most recent echo   Vertebral artery dz-cound on carotid duplex     PSH-RUE \"cyst\" removal    SH-   Works in dietary   + sex active + condoms all the time    Lives with friend   No tobacco, occas alcohol, no illicit drug use       FH-father  ~70 ? ? DM complications   Mother  ~76 DM complication   2 siblings healthy        Review of Systems  Constitutional: negative for fevers, chills, anorexia and weight loss  Eyes:   negative for visual disturbance and irritation  ENT:   negative for tinnitus,sore throat,nasal congestion,ear pains. hoarseness  Respiratory:  negative for cough, hemoptysis, dyspnea,wheezing  CV:   negative for chest pain, palpitations, lower extremity edema  GI:   negative for nausea, vomiting, diarrhea, abdominal pain,melena  Musculoskel: negative for myalgias, arthralgias, back pain, muscle weakness, joint pain  Neurological:  negative for headaches, dizziness, vertigo, memory problems and gait   Behavl/Psych: negative for feelings of anxiety, depression, mood changes    Past Medical History:   Diagnosis Date    Seizures ([address]     seizure as a child     Past Surgical History:   Procedure Laterality Date  HX OTHER SURGICAL      cyst removed from right shoulder     Social History     Socioeconomic History    Marital status: LEGALLY    Tobacco Use    Smoking status: Never Smoker    Smokeless tobacco: Never Used   Vaping Use    Vaping Use: Never used   Substance and Sexual Activity    Alcohol use: No     Comment: socially    Drug use: No    Sexual activity: Yes     Partners: Female     Birth control/protection: None     History reviewed. No pertinent family history. Allergies   Allergen Reactions    Egg Derived Nausea and Vomiting       Objective:  Visit Vitals  /86 (BP 1 Location: Left upper arm, BP Patient Position: Sitting, BP Cuff Size: Large adult)   Pulse 77   Temp 98 °F (36.7 °C) (Temporal)   Resp 19   Ht 5' 9\" (1.753 m)   Wt 250 lb (113.4 kg)   SpO2 98%   BMI 36.92 kg/m²     Physical Exam:   General appearance - alert,obese pleasant  Mental status - alert, oriented to person, place, and time  EYE-EOMI  Mouth - crowded pharynx (pt denies signif snoring)  Neck - supple, no significant adenopathy  + palp left thyroid>right  Chest - clear to auscultation  Heart - normal rate, regular rhythm, normal S1, S2  Abdomen - soft, nontender, nobese  Ext-peripheral pulses normal, no pedal edema, no clubbing or cyanosis  Skin-Warm and dry.  no hyperpigmentation, vitiligo, or suspicious lesions  Neuro -alert, oriented, normal speech, no focal findings or movement disorder noted      Results for orders placed or performed during the hospital encounter of 12/30/21   COVID-19 WITH INFLUENZA A/B   Result Value Ref Range    SARS-CoV-2 Detected (A) NOTD      Influenza A by PCR Not detected      Influenza B by PCR Not detected     CBC WITH AUTOMATED DIFF   Result Value Ref Range    WBC 4.9 4.1 - 11.1 K/uL    RBC 4.72 4.10 - 5.70 M/uL    HGB 12.5 12.1 - 17.0 g/dL    HCT 40.9 36.6 - 50.3 %    MCV 86.7 80.0 - 99.0 FL    MCH 26.5 26.0 - 34.0 PG    MCHC 30.6 30.0 - 36.5 g/dL    RDW 13.8 11.5 - 14.5 % PLATELET 251 650 - 056 K/uL    MPV 9.2 8.9 - 12.9 FL    NRBC 0.0 0  WBC    ABSOLUTE NRBC 0.00 0.00 - 0.01 K/uL    NEUTROPHILS 49 32 - 75 %    LYMPHOCYTES 33 12 - 49 %    MONOCYTES 10 5 - 13 %    EOSINOPHILS 7 0 - 7 %    BASOPHILS 1 0 - 1 %    IMMATURE GRANULOCYTES 0 0.0 - 0.5 %    ABS. NEUTROPHILS 2.5 1.8 - 8.0 K/UL    ABS. LYMPHOCYTES 1.6 0.8 - 3.5 K/UL    ABS. MONOCYTES 0.5 0.0 - 1.0 K/UL    ABS. EOSINOPHILS 0.3 0.0 - 0.4 K/UL    ABS. BASOPHILS 0.0 0.0 - 0.1 K/UL    ABS. IMM. GRANS. 0.0 0.00 - 0.04 K/UL    DF AUTOMATED     METABOLIC PANEL, COMPREHENSIVE   Result Value Ref Range    Sodium 139 136 - 145 mmol/L    Potassium 3.8 3.5 - 5.1 mmol/L    Chloride 103 97 - 108 mmol/L    CO2 30 21 - 32 mmol/L    Anion gap 6 5 - 15 mmol/L    Glucose 123 (H) 65 - 100 mg/dL    BUN 8 6 - 20 MG/DL    Creatinine 1.19 0.70 - 1.30 MG/DL    BUN/Creatinine ratio 7 (L) 12 - 20      GFR est AA >60 >60 ml/min/1.73m2    GFR est non-AA >60 >60 ml/min/1.73m2    Calcium 8.5 8.5 - 10.1 MG/DL    Bilirubin, total 0.4 0.2 - 1.0 MG/DL    ALT (SGPT) 37 12 - 78 U/L    AST (SGOT) 22 15 - 37 U/L    Alk.  phosphatase 98 45 - 117 U/L    Protein, total 7.4 6.4 - 8.2 g/dL    Albumin 3.6 3.5 - 5.0 g/dL    Globulin 3.8 2.0 - 4.0 g/dL    A-G Ratio 0.9 (L) 1.1 - 2.2     TROPONIN-HIGH SENSITIVITY   Result Value Ref Range    Troponin-High Sensitivity 4 0 - 76 ng/L   METABOLIC PANEL, BASIC   Result Value Ref Range    Sodium 141 136 - 145 mmol/L    Potassium 3.9 3.5 - 5.1 mmol/L    Chloride 105 97 - 108 mmol/L    CO2 27 21 - 32 mmol/L    Anion gap 9 5 - 15 mmol/L    Glucose 128 (H) 65 - 100 mg/dL    BUN 9 6 - 20 MG/DL    Creatinine 1.04 0.70 - 1.30 MG/DL    BUN/Creatinine ratio 9 (L) 12 - 20      GFR est AA >60 >60 ml/min/1.73m2    GFR est non-AA >60 >60 ml/min/1.73m2    Calcium 8.4 (L) 8.5 - 10.1 MG/DL   MAGNESIUM   Result Value Ref Range    Magnesium 2.4 1.6 - 2.4 mg/dL   CBC W/O DIFF   Result Value Ref Range    WBC 5.8 4.1 - 11.1 K/uL    RBC 4.93 4.10 - 5.70 M/uL    HGB 12.7 12.1 - 17.0 g/dL    HCT 42.5 36.6 - 50.3 %    MCV 86.2 80.0 - 99.0 FL    MCH 25.8 (L) 26.0 - 34.0 PG    MCHC 29.9 (L) 30.0 - 36.5 g/dL    RDW 13.7 11.5 - 14.5 %    PLATELET 856 277 - 451 K/uL    MPV 9.5 8.9 - 12.9 FL    NRBC 0.0 0  WBC    ABSOLUTE NRBC 0.00 0.00 - 0.01 K/uL   TROPONIN-HIGH SENSITIVITY   Result Value Ref Range    Troponin-High Sensitivity 5 0 - 76 ng/L   TROPONIN-HIGH SENSITIVITY   Result Value Ref Range    Troponin-High Sensitivity 5 0 - 76 ng/L   EKG, 12 LEAD, INITIAL   Result Value Ref Range    Ventricular Rate 76 BPM    Atrial Rate 76 BPM    P-R Interval 196 ms    QRS Duration 86 ms    Q-T Interval 382 ms    QTC Calculation (Bezet) 429 ms    Calculated P Axis 58 degrees    Calculated R Axis 35 degrees    Calculated T Axis 49 degrees    Diagnosis       Normal sinus rhythm  Normal ECG  When compared with ECG of 16-MAR-2019 16:43,  No significant change was found  Confirmed by Jose Barrientos M.D., [place]a Plant ([id number]) on 1/1/2022 1:41:59 PM         Assessment/Plan:    ICD-10-CM ICD-9-CM    1. Syncope, unspecified syncope type  R55 780.2 REFERRAL TO CARDIOLOGY   2. Vertebral-basilar artery occlusive syndrome  G45.0 433.20 REFERRAL TO CARDIOLOGY   3. Low left ventricular ejection fraction  R94.30 794.30 REFERRAL TO CARDIOLOGY   4. Obesity (BMI 35.0-39.9 without comorbidity)  E66.9 278.00 REFERRAL TO CARDIOLOGY   5. Thyromegaly  E01.0 240.9 US THYROID/PARATHYROID/SOFT TISS     Orders Placed This Encounter    US THYROID/PARATHYROID/SOFT TISS     Standing Status:   Future     Standing Expiration Date:   4/24/2022    REFERRAL TO CARDIOLOGY     Referral Priority:   Routine     Referral Type:   Consultation     Referral Reason:   Specialty Services Required     Referred to Provider:   Farhat Benson NP     Requested Specialty:   Cardiology     Number of Visits Requested:   1   1.  Syncope, unspecified syncope type    - REFERRAL TO CARDIOLOGY    2. Vertebral-basilar artery occlusive syndrome  ? Needs vascular intervention  Will defer to cards  - REFERRAL TO CARDIOLOGY    3. Low left ventricular ejection fraction  Modestly low  - REFERRAL TO CARDIOLOGY    4. Obesity (BMI 35.0-39.9 without comorbidity)  D/w pt daily walking (at least 20 minutes), healthy diet w fruits and/or veggies w each meal, portion sizes and weight loss    - REFERRAL TO CARDIOLOGY    5. Thyromegaly    - US THYROID/PARATHYROID/SOFT TISS; Future    There are no Patient Instructions on file for this visit. Follow-up and Dispositions    · Return if symptoms worsen or fail to improve. I have reviewed with the patient details of the assessment and plan and all questions were answered. Relevent patient education was performed. The most recent lab findings were reviewed with the patient. An After Visit Summary was printed and given to the patient.

## 2022-01-25 NOTE — PROGRESS NOTES
[facility] @ [address] [place], [place]  Subjective/HPI: Odilia Sims is a 61 y.o. male with a past medical history of seizure disorder as a child has been referred by primary care for syncopal episode that occurred at the end of last year. While in the setting of active Covid with large amount of coughing patient reports syncopal episode after significant coughing event. He also experiences dizziness with quick positional changes. He denies blurred vision headache or slurred speech. He has had prior syncope/near syncope in 2019 where he underwent head CT which was normal, CTA was 0% bilateral carotids. He had a echocardiogram done at the time showing an EF of 46-50%. He denies exertional chest pain, has mild dyspnea with climbing stairs. Family history atherosclerotic heart disease: Brother with type 2 diabetes and morbid obesity recently had cardiac stenting. Patient is a non-smoker, minimal EtOH use. Minimal to no caffeine use, states he is well-hydrated. DORETHA screening: Patient denies snoring, does not awaken dyspneic, no daytime fatigue, no frequent napping. Stop bang score 4 (gender/age/BMI/neck circumference) patient subjectively asymptomatic    CT Head 12/2021  FINDINGS:  The ventricles are normal in size and position. There is unchanged left  cerebellar hypoplasia. Basilar cisterns are patent. No midline shift. There is  no evidence of acute infarct, hemorrhage, or extraaxial fluid collection. Unchanged small calcifications in the right temporo-occipital lobe.     Scattered mucosal thickening in the bilateral ethmoidal air cells and maxillary  sinuses without air-fluid level. The mastoid air cells and middle ears are  clear. The orbital contents are within normal limits. There are no significant  osseous or extracranial soft tissue lesions.     IMPRESSION  1. No evidence of acute intracranial abnormality.   2. Unchanged left cerebellar hypoplasia.     ECHO 3/2019  Echo Findings    Left Ventricle Normal cavity size and wall thickness. The muscle mass is normal. The cavity shape is normal. There is low normal systolic function. The estimated ejection fraction is 46 - 50%. No regional wall motion abnormality noted. End-systolic volume is normal. Normal left ventricular strain. There is age-appropriate left ventricular diastolic function. Normal left ventricular diastolic pressure. End-diastolic volume is normal. No ventricular septal defect. No ventricular assist device is present. No left ventricular non-compaction. No spontaneous echo contrastThere is no thrombus. There is no mass. Left Atrium Normal cavity size. No atrial septal defect present. No patent foramen ovale visualized. There is no thrombus. There is no mass. No spontaneous echo contrast. No interatrial septal aneurysm present. . There appears to be no lipomatous hypertrophy of the interatrial septum. Left atrial appendage not well visualized. Right Ventricle Normal wall thickness and septal motion. The cavity size is mildly dilated. Normal wall motion. Global systolic function is borderline low. There is no thrombus. There is no thrombus. No mass is present. The RV is moderately dilated with what appears to be preserved systolic function. Right Atrium Normal size. There is no thrombus. There is no mass. No spontaneous echo contrast used. Aortic Valve Trileaflet valve structure, no stenosis and no regurgitation. Normal aortic leaflet mobility. There is no vegetation on theThere is no mass on the aortic valve. There is no abscess on the aortic valve. Flail aortic valve leaflets were not observed. There is no perforation on the aortic valve. The aortic annulus is dilated to 38 mm with effacement of the sinotubular junction. Mitral Valve Normal valve structure, no stenosis and no regurgitation. There is no vegetation on the mitral valve.   There is no mass on theThere is no thrombus on theNormal leaflet mobility. Tricuspid Valve Normal valve structure, no stenosis and no regurgitation. Unable to obtain pulmonary arterial systolic pressure. There is no vegetation on the tricuspid valve. There is no thrombus on theThere is no mass on the   Pulmonic Valve Normal valve structure, no stenosis and no regurgitation. Aorta The sinuses of Valsalva are mildly dilated. The aortic root is mildly dilated. The ascending aorta is mildly dilated. No aneurysm present. There is  no plaque. aortic graft not. No significant coarctation. Pericardium Normal pericardium and no evidence of pericardial effusion. Carotid 3/2019  Interpretation Summary    · The right ICA is normal.  · The right vertebral is antegrade. · The left ICA is normal.  · The left vertebral is antegrade. The vertebral artery Doppler signal is resistant suggestive of distal occlusive disease. · INCIDENTAL FINDING: The left lobe of thyroid appears slightly enlarged compared to right side. Further follow up recommended. PCP Provider  Rebecca Wong MD  Past Medical History:   Diagnosis Date    Seizures [facility])     seizure as a child    Syncope       Past Surgical History:   Procedure Laterality Date    HX OTHER SURGICAL      cyst removed from right shoulder     Allergies   Allergen Reactions    Egg Derived Nausea and Vomiting      Family History   Problem Relation Age of Onset    Heart Disease Brother       No current outpatient medications on file. No current facility-administered medications for this visit.       Vitals:    01/26/22 1310   Resp: 18   Temp: 98.4 °F (36.9 °C)   TempSrc: Temporal   SpO2: 98%   Weight: 250 lb (113.4 kg)   Height: 5' 9\" (1.753 m)     Social History     Socioeconomic History    Marital status: LEGALLY      Spouse name: Not on file    Number of children: Not on file    Years of education: Not on file    Highest education level: Not on file   Occupational History    Not on file   Tobacco Use    Smoking status: Current Some Day Smoker    Smokeless tobacco: Never Used   Vaping Use    Vaping Use: Never used   Substance and Sexual Activity    Alcohol use: Yes     Comment: socially    Drug use: No    Sexual activity: Yes     Partners: Female     Birth control/protection: None   Other Topics Concern    Not on file   Social History Narrative    Not on file     Social Determinants of Health     Financial Resource Strain:     Difficulty of Paying Living Expenses: Not on file   Food Insecurity:     Worried About Running Out of Food in the Last Year: Not on file    Staci of Food in the Last Year: Not on file   Transportation Needs:     Lack of Transportation (Medical): Not on file    Lack of Transportation (Non-Medical): Not on file   Physical Activity:     Days of Exercise per Week: Not on file    Minutes of Exercise per Session: Not on file   Stress:     Feeling of Stress : Not on file   Social Connections:     Frequency of Communication with Friends and Family: Not on file    Frequency of Social Gatherings with Friends and Family: Not on file    Attends Jew Services: Not on file    Active Member of [address] [place] [address] or Organizations: Not on file    Attends Club or Organization Meetings: Not on file    Marital Status: Not on file   Intimate Partner Violence:     Fear of Current or Ex-Partner: Not on file    Emotionally Abused: Not on file    Physically Abused: Not on file    Sexually Abused: Not on file   Housing Stability:     Unable to Pay for Housing in the Last Year: Not on file    Number of [place] in the Last Year: Not on file    Unstable Housing in the Last Year: Not on file       I have reviewed the nurses notes, vitals, problem list, allergy list, medical history, family, social history and medications.     Review of Symptoms  11 systems reviewed, negative other than as stated in the HPI      Physical Exam:      General: Well developed, in no acute distress, cooperative and alert  HEENT: No carotid bruits, no JVD, trach is midline. Neck Supple, PERRL, EOM intact. Heart:  Normal S1/S2 negative S3 or S4. Regular, no murmur, gallop or rub. Respiratory: Clear bilaterally x 4, no wheezing or rales  Abdomen:   Soft, non-tender, no masses, bowel sounds are active. Extremities:  No edema, normal cap refill, no cyanosis, atraumatic. Neuro: A&Ox3, speech clear, gait stable. Skin: Skin color is normal. No rashes or lesions. Non diaphoretic  Vascular: 2+ pulses symmetric in all extremities    Cardiographics    ECG: Sinus rhythm        Cardiology Labs:  Lab Results   Component Value Date/Time    Cholesterol, total 190 07/17/2020 09:47 AM    HDL Cholesterol 53 07/17/2020 09:47 AM    LDL, calculated 119 (H) 07/17/2020 09:47 AM    Triglyceride 89 07/17/2020 09:47 AM       Lab Results   Component Value Date/Time    Sodium 141 12/31/2021 04:53 AM    Potassium 3.9 12/31/2021 04:53 AM    Chloride 105 12/31/2021 04:53 AM    CO2 27 12/31/2021 04:53 AM    Anion gap 9 12/31/2021 04:53 AM    Glucose 128 (H) 12/31/2021 04:53 AM    BUN 9 12/31/2021 04:53 AM    Creatinine 1.04 12/31/2021 04:53 AM    BUN/Creatinine ratio 9 (L) 12/31/2021 04:53 AM    GFR est AA >60 12/31/2021 04:53 AM    GFR est non-AA >60 12/31/2021 04:53 AM    Calcium 8.4 (L) 12/31/2021 04:53 AM    Bilirubin, total 0.4 12/30/2021 08:21 PM    Alk. phosphatase 98 12/30/2021 08:21 PM    Protein, total 7.4 12/30/2021 08:21 PM    Albumin 3.6 12/30/2021 08:21 PM    Globulin 3.8 12/30/2021 08:21 PM    A-G Ratio 0.9 (L) 12/30/2021 08:21 PM    ALT (SGPT) 37 12/30/2021 08:21 PM           Assessment:     Assessment:     Diagnoses and all orders for this visit:    1. Syncope, unspecified syncope type  -     AMB POC EKG ROUTINE W/ 12 LEADS, INTER & REP  -     CARDIAC EVENT MONITOR; Future  -     ECHO ADULT COMPLETE; Future    2. Dizziness  -     CARDIAC EVENT MONITOR; Future  -     ECHO ADULT COMPLETE; Future    3.  ESPARZA (dyspnea on exertion)  -     CARDIAC EVENT MONITOR; Future  -     ECHO ADULT COMPLETE; Future        ICD-10-CM ICD-9-CM    1. Syncope, unspecified syncope type  R55 780.2 AMB POC EKG ROUTINE W/ 12 LEADS, INTER & REP      CARDIAC EVENT MONITOR      ECHO ADULT COMPLETE   2. Dizziness  R42 780.4 CARDIAC EVENT MONITOR      ECHO ADULT COMPLETE   3. ESPARZA (dyspnea on exertion)  R06.00 786.09 CARDIAC EVENT MONITOR      ECHO ADULT COMPLETE     Orders Placed This Encounter    AMB POC EKG ROUTINE W/ 12 LEADS, INTER & REP     Order Specific Question:   Reason for Exam:     Answer:   routine        Plan:     Patient is a 18-year-old male admitted at the end of last year for syncopal episode. Was felt secondary to vasovagal during coughing. Continues to have episodes of intermittent lightheadedness with positional change, no true syncope since hospitalization, he is not orthostatic on exam today. From cardiac standpoint will evaluate for arrhythmia with event monitor and 2D echocardiogram also for reassessment of his low normal ejection fraction seen on echo in 2019. If recurrent syncope would recommend neurology consultation. Bethany Rivera NP      Please note that this dictation was completed with [place], the [employer] voice recognition software. Quite often unanticipated grammatical, syntax, homophones, and other interpretive errors are inadvertently transcribed by the computer software. Please disregard these errors. Please excuse any errors that have escaped final proofreading. Thank you.

## 2022-01-26 ENCOUNTER — OFFICE VISIT (OUTPATIENT)
Dept: CARDIOLOGY CLINIC | Age: 57
End: 2022-01-26
Payer: COMMERCIAL

## 2022-01-26 VITALS
TEMPERATURE: 98.4 F | WEIGHT: 250 LBS | BODY MASS INDEX: 37.03 KG/M2 | HEIGHT: 69 IN | OXYGEN SATURATION: 98 % | RESPIRATION RATE: 18 BRPM

## 2022-01-26 DIAGNOSIS — R42 DIZZINESS: ICD-10-CM

## 2022-01-26 DIAGNOSIS — R55 SYNCOPE, UNSPECIFIED SYNCOPE TYPE: Primary | ICD-10-CM

## 2022-01-26 DIAGNOSIS — R06.09 DOE (DYSPNEA ON EXERTION): ICD-10-CM

## 2022-01-26 PROCEDURE — 99204 OFFICE O/P NEW MOD 45 MIN: CPT | Performed by: NURSE PRACTITIONER

## 2022-01-26 PROCEDURE — 93000 ELECTROCARDIOGRAM COMPLETE: CPT | Performed by: NURSE PRACTITIONER

## 2022-01-26 NOTE — PROGRESS NOTES
Chief Complaint   Patient presents with    Referral / Consult     Referred by Dr. Edwardo Delong d/t syncope.  Syncope     \"I was coughing, chocking, then passed out. I went to the hospital then told I had COVID. \" per patient    Shortness of Breath     \"When I bend over\" per patient.

## 2022-01-31 ENCOUNTER — HOSPITAL ENCOUNTER (OUTPATIENT)
Dept: VASCULAR SURGERY | Age: 57
Discharge: HOME | End: 2022-01-31
Payer: COMMERCIAL

## 2022-01-31 ENCOUNTER — HOSPITAL ENCOUNTER (OUTPATIENT)
Dept: ULTRASOUND IMAGING | Age: 57
Discharge: HOME | End: 2022-01-31
Payer: COMMERCIAL

## 2022-01-31 ENCOUNTER — HOSPITAL ENCOUNTER (OUTPATIENT)
Dept: NON INVASIVE DIAGNOSTICS | Age: 57
Discharge: HOME | End: 2022-01-31
Payer: COMMERCIAL

## 2022-01-31 DIAGNOSIS — R42 DIZZINESS: ICD-10-CM

## 2022-01-31 DIAGNOSIS — E01.0 THYROMEGALY: ICD-10-CM

## 2022-01-31 DIAGNOSIS — R55 SYNCOPE, UNSPECIFIED SYNCOPE TYPE: ICD-10-CM

## 2022-01-31 DIAGNOSIS — R06.09 DOE (DYSPNEA ON EXERTION): ICD-10-CM

## 2022-01-31 LAB
ECHO AO ROOT DIAM: 2.9 CM
ECHO AV AREA PLAN: 3.2 CM2
ECHO AV AREA PLAN: 3.2 CM2
ECHO LA DIAMETER: 3.8 CM
ECHO LA TO AORTIC ROOT RATIO: 1.31
ECHO LA VOL 4C: 68 ML (ref 18–58)
ECHO LV EDV A4C: 135 ML
ECHO LV EJECTION FRACTION A4C: 63 %
ECHO LV ESV A4C: 50 ML
ECHO LV FRACTIONAL SHORTENING: 31 % (ref 28–44)
ECHO LV INTERNAL DIMENSION DIASTOLIC: 4.9 CM (ref 4.2–5.9)
ECHO LV INTERNAL DIMENSION SYSTOLIC: 3.4 CM
ECHO LV IVSD: 1 CM (ref 0.6–1)
ECHO LV MASS 2D: 164.3 G (ref 88–224)
ECHO LV POSTERIOR WALL DIASTOLIC: 0.9 CM (ref 0.6–1)
ECHO LV RELATIVE WALL THICKNESS RATIO: 0.37
ECHO LVOT AREA: 4.5 CM2
ECHO LVOT DIAM: 2.4 CM
ECHO LVOT PEAK GRADIENT: 4 MMHG
ECHO LVOT PEAK VELOCITY: 0.9 M/S
ECHO MV A VELOCITY: 0.49 M/S
ECHO MV AREA PLAN: 6.3 CM2
ECHO MV AREA PLAN: 6.3 CM2
ECHO MV E DECELERATION TIME (DT): 144.9 MS
ECHO MV E VELOCITY: 0.64 M/S
ECHO MV E/A RATIO: 1.31
ECHO MV MAX VELOCITY: 1 M/S
ECHO MV MEAN GRADIENT: 1 MMHG
ECHO MV MEAN VELOCITY: 0.5 M/S
ECHO MV PEAK GRADIENT: 4 MMHG
ECHO MV PRESSURE HALF TIME (PHT): 127.3 MS
ECHO MV PRESSURE HALF TIME (PHT): 42 MS
ECHO MV VTI: 34.9 CM
ECHO PULMONARY ARTERY END DIASTOLIC PRESSURE: 5 MMHG
ECHO PULMONARY ARTERY END DIASTOLIC PRESSURE: 7 MMHG
ECHO PV MAX VELOCITY: 0.8 M/S
ECHO PV MAX VELOCITY: 1.4 M/S
ECHO PV PEAK GRADIENT: 2 MMHG
ECHO PV REGURGITANT MAX VELOCITY: 1.1 M/S

## 2022-01-31 PROCEDURE — 93271 ECG/MONITORING AND ANALYSIS: CPT

## 2022-01-31 PROCEDURE — 93272 ECG/REVIEW INTERPRET ONLY: CPT | Performed by: INTERNAL MEDICINE

## 2022-01-31 PROCEDURE — 93306 TTE W/DOPPLER COMPLETE: CPT

## 2022-01-31 PROCEDURE — 76536 US EXAM OF HEAD AND NECK: CPT

## 2022-01-31 PROCEDURE — 93306 TTE W/DOPPLER COMPLETE: CPT | Performed by: SPECIALIST

## 2022-01-31 NOTE — PROGRESS NOTES
30 day event monitor placed on patient. Patient educated on device and extra supplies provided. No questions at this time.      Monitor # [id number]

## 2022-02-01 ENCOUNTER — TELEPHONE (OUTPATIENT)
Dept: CARDIOLOGY CLINIC | Age: 57
End: 2022-02-01

## 2022-02-01 NOTE — TELEPHONE ENCOUNTER
I called and spoke with the patient, two identifiers verified. I have discussed this with the patient:  ----- Message from Lesley Sorto NP sent at 2/1/2022  9:50 AM EST -----  \"Please call patient ECHO normal \"    Patient verbalized his concerns why is he passing out if Echo is normal.  I strongly advised patient to document symptoms while he has his event monitor. I educated him the importance of the cardiac monitor in detecting rhythm problems. Patient verbalized understanding. No other concerns at this time.

## 2022-02-22 ENCOUNTER — APPOINTMENT (OUTPATIENT)
Dept: GENERAL RADIOLOGY | Age: 57
End: 2022-02-22
Attending: EMERGENCY MEDICINE
Payer: COMMERCIAL

## 2022-02-22 ENCOUNTER — HOSPITAL ENCOUNTER (EMERGENCY)
Age: 57
Discharge: HOME | End: 2022-02-22
Attending: EMERGENCY MEDICINE
Payer: COMMERCIAL

## 2022-02-22 VITALS
OXYGEN SATURATION: 100 % | DIASTOLIC BLOOD PRESSURE: 67 MMHG | WEIGHT: 240 LBS | SYSTOLIC BLOOD PRESSURE: 126 MMHG | RESPIRATION RATE: 22 BRPM | TEMPERATURE: 98.4 F | HEART RATE: 66 BPM | BODY MASS INDEX: 35.55 KG/M2 | HEIGHT: 69 IN

## 2022-02-22 DIAGNOSIS — R07.9 CHEST PAIN, UNSPECIFIED TYPE: Primary | ICD-10-CM

## 2022-02-22 DIAGNOSIS — R07.9 CHEST PAIN IN ADULT: Primary | ICD-10-CM

## 2022-02-22 LAB
ALBUMIN SERPL-MCNC: 2.8 G/DL (ref 3.5–5)
ALBUMIN/GLOB SERPL: 1 {RATIO} (ref 1.1–2.2)
ALP SERPL-CCNC: 78 U/L (ref 45–117)
ALT SERPL-CCNC: 24 U/L (ref 12–78)
ANION GAP SERPL CALC-SCNC: 3 MMOL/L (ref 5–15)
AST SERPL-CCNC: 13 U/L (ref 15–37)
ATRIAL RATE: 71 BPM
BASOPHILS # BLD: 0 K/UL (ref 0–0.1)
BASOPHILS NFR BLD: 0 % (ref 0–1)
BILIRUB SERPL-MCNC: 0.3 MG/DL (ref 0.2–1)
BNP SERPL-MCNC: 25 PG/ML
BUN SERPL-MCNC: 8 MG/DL (ref 6–20)
BUN/CREAT SERPL: 10 (ref 12–20)
CALCIUM SERPL-MCNC: 7 MG/DL (ref 8.5–10.1)
CALCULATED P AXIS, ECG09: 53 DEGREES
CALCULATED R AXIS, ECG10: 37 DEGREES
CALCULATED T AXIS, ECG11: 41 DEGREES
CHLORIDE SERPL-SCNC: 116 MMOL/L (ref 97–108)
CO2 SERPL-SCNC: 24 MMOL/L (ref 21–32)
CREAT SERPL-MCNC: 0.77 MG/DL (ref 0.7–1.3)
DIAGNOSIS, 93000: NORMAL
DIFFERENTIAL METHOD BLD: ABNORMAL
EOSINOPHIL # BLD: 0.1 K/UL (ref 0–0.4)
EOSINOPHIL NFR BLD: 1 % (ref 0–7)
ERYTHROCYTE [DISTWIDTH] IN BLOOD BY AUTOMATED COUNT: 14.4 % (ref 11.5–14.5)
GLOBULIN SER CALC-MCNC: 2.8 G/DL (ref 2–4)
GLUCOSE SERPL-MCNC: 83 MG/DL (ref 65–100)
HCT VFR BLD AUTO: 39.8 % (ref 36.6–50.3)
HGB BLD-MCNC: 12 G/DL (ref 12.1–17)
IMM GRANULOCYTES # BLD AUTO: 0 K/UL (ref 0–0.04)
IMM GRANULOCYTES NFR BLD AUTO: 0 % (ref 0–0.5)
LYMPHOCYTES # BLD: 1.9 K/UL (ref 0.8–3.5)
LYMPHOCYTES NFR BLD: 19 % (ref 12–49)
MCH RBC QN AUTO: 26.1 PG (ref 26–34)
MCHC RBC AUTO-ENTMCNC: 30.2 G/DL (ref 30–36.5)
MCV RBC AUTO: 86.7 FL (ref 80–99)
MONOCYTES # BLD: 0.7 K/UL (ref 0–1)
MONOCYTES NFR BLD: 7 % (ref 5–13)
NEUTS SEG # BLD: 7.6 K/UL (ref 1.8–8)
NEUTS SEG NFR BLD: 73 % (ref 32–75)
NRBC # BLD: 0 K/UL (ref 0–0.01)
NRBC BLD-RTO: 0 PER 100 WBC
P-R INTERVAL, ECG05: 192 MS
PLATELET # BLD AUTO: 253 K/UL (ref 150–400)
PMV BLD AUTO: 9.3 FL (ref 8.9–12.9)
POTASSIUM SERPL-SCNC: 3.3 MMOL/L (ref 3.5–5.1)
PROT SERPL-MCNC: 5.6 G/DL (ref 6.4–8.2)
Q-T INTERVAL, ECG07: 400 MS
QRS DURATION, ECG06: 82 MS
QTC CALCULATION (BEZET), ECG08: 434 MS
RBC # BLD AUTO: 4.59 M/UL (ref 4.1–5.7)
SODIUM SERPL-SCNC: 143 MMOL/L (ref 136–145)
TROPONIN-HIGH SENSITIVITY: <4 NG/L (ref 0–76)
VENTRICULAR RATE, ECG03: 71 BPM
WBC # BLD AUTO: 10.4 K/UL (ref 4.1–11.1)

## 2022-02-22 PROCEDURE — 36415 COLL VENOUS BLD VENIPUNCTURE: CPT

## 2022-02-22 PROCEDURE — 99285 EMERGENCY DEPT VISIT HI MDM: CPT

## 2022-02-22 PROCEDURE — 80053 COMPREHEN METABOLIC PANEL: CPT

## 2022-02-22 PROCEDURE — 93005 ELECTROCARDIOGRAM TRACING: CPT

## 2022-02-22 PROCEDURE — 85025 COMPLETE CBC W/AUTO DIFF WBC: CPT

## 2022-02-22 PROCEDURE — 83880 ASSAY OF NATRIURETIC PEPTIDE: CPT

## 2022-02-22 PROCEDURE — 84484 ASSAY OF TROPONIN QUANT: CPT

## 2022-02-22 PROCEDURE — 71045 X-RAY EXAM CHEST 1 VIEW: CPT

## 2022-02-22 NOTE — ED NOTES
Pt is here due to CP starting last night. He states that he has had CP on and off for 3 years. Pt rates his pain as 5 at this time. Pt denies SOB. He explains his pain as sharp in nature.

## 2022-02-22 NOTE — LETTER
[address]  [facility] EMERGENCY DEPT  [address] [place] [address]  Ariela Newton [id number] [phone]    Work/School Note    Date: 2/22/2022    To Whom It May concern:    Jordan Santana was seen and treated today in the emergency room by the following provider(s):  Resident: Suzi Rajan MD.      Jordan Santana may return to work on 02/23/22.     Sincerely,          Betsey Meadows RN

## 2022-02-22 NOTE — DISCHARGE INSTRUCTIONS
You were evaluated for chest pain. Your labs and EKG did not show an acute problem causing your symptoms, but it is very important that you follow up with cardiology for outpatient stress testing. This will be arranged by your cardiology team. Zachariah Loza must return to the ED if you experience worsening chest pain, shortness of breath, or other symptoms requiring emergency care.

## 2022-02-22 NOTE — ED PROVIDER NOTES
EMERGENCY DEPARTMENT HISTORY AND PHYSICAL EXAM          Date: 2/22/2022  Patient Name: Shirley Harrison  Attending of Record: Dr. Kilo Martin    History of Presenting Illness     Chief Complaint   Patient presents with    Chest Pain     employee at [employer]. He started lat night with midsternal constant pressure chest pain. He received one nitro from EMS with reducction in cp to 5/10. He has a headache from that       History Provided By: Patient and EMS    HPI: Shirley Harrison is a 61 y.o. male with PmHx significant for obesity but who reports he is otherwise healthy and not on chronic medications, who presents to the ED via EMS with chief complaint of crushing substernal chest pain responsive to nitroglycerin. He reports he has had episodes of symptoms with his heart x 3 years, and reports multiple syncopal episodes, although none recently. The patient reports he has never had a stress test or LHC. The patient reports that he has not had any nausea/vomiting, abdominal pain, diarrhea/constipation. He reports that he does not often exercise, and so is not sure if his symptoms are exacerbated by activity. PCP: Alexandre Caldera MD    There are no other complaints, changes, or physical findings at this time. Past History     Past Medical History:  Past Medical History:   Diagnosis Date    Seizures ([address]     seizure as a child    Syncope        Past Surgical History:  Past Surgical History:   Procedure Laterality Date    HX OTHER SURGICAL      cyst removed from right shoulder       Family History:  Family History   Problem Relation Age of Onset    Heart Disease Brother        Social History:  Social History     Tobacco Use    Smoking status: Current Some Day Smoker    Smokeless tobacco: Never Used   Vaping Use    Vaping Use: Never used   Substance Use Topics    Alcohol use: Yes     Comment: socially    Drug use: No       Allergies:   Allergies   Allergen Reactions    Egg Derived Nausea and Vomiting     Review of Systems   Review of Systems   Constitutional: Negative for chills and fever. HENT: Negative for congestion, rhinorrhea and sore throat. Eyes: Negative for pain, discharge and visual disturbance. Respiratory: Positive for chest tightness. Negative for cough and shortness of breath. Cardiovascular: Positive for chest pain. Negative for palpitations and leg swelling. Gastrointestinal: Negative for abdominal pain, blood in stool, constipation, diarrhea, nausea and vomiting. Genitourinary: Negative for dysuria and hematuria. Musculoskeletal: Negative for arthralgias, back pain, joint swelling and myalgias. Skin: Negative for color change and rash. Neurological: Negative for dizziness, weakness, light-headedness and headaches. All other systems reviewed and are negative. Physical Exam   Physical Exam  Vitals and nursing note reviewed. Constitutional:       General: He is not in acute distress. Appearance: Normal appearance. He is not ill-appearing. HENT:      Head: Normocephalic and atraumatic. Right Ear: External ear normal.      Left Ear: External ear normal.      Nose: Nose normal.      Mouth/Throat:      Mouth: Mucous membranes are moist.      Pharynx: Oropharynx is clear. Eyes:      General: No scleral icterus. Right eye: No discharge. Left eye: No discharge. Conjunctiva/sclera: Conjunctivae normal.   Cardiovascular:      Rate and Rhythm: Normal rate and regular rhythm. Pulses: Normal pulses. Heart sounds: Normal heart sounds. No murmur heard. No friction rub. No gallop. Pulmonary:      Effort: Pulmonary effort is normal.      Breath sounds: Normal breath sounds. No wheezing, rhonchi or rales. Abdominal:      General: Abdomen is flat. Palpations: Abdomen is soft. Tenderness: There is no abdominal tenderness. Musculoskeletal:         General: Normal range of motion.       Cervical back: Normal range of motion and neck supple. Skin:     General: Skin is warm and dry. Capillary Refill: Capillary refill takes less than 2 seconds. Neurological:      General: No focal deficit present. Mental Status: He is alert and oriented to person, place, and time. Diagnostic Study Results     Labs -     Recent Results (from the past 12 hour(s))   EKG, 12 LEAD, INITIAL    Collection Time: 02/22/22 11:55 AM   Result Value Ref Range    Ventricular Rate 71 BPM    Atrial Rate 71 BPM    P-R Interval 192 ms    QRS Duration 82 ms    Q-T Interval 400 ms    QTC Calculation (Bezet) 434 ms    Calculated P Axis 53 degrees    Calculated R Axis 37 degrees    Calculated T Axis 41 degrees    Diagnosis       Normal sinus rhythm  Nonspecific T wave abnormality  When compared with ECG of 30-DEC-2021 19:46,  Nonspecific T wave abnormality now evident in Anterior leads     CBC WITH AUTOMATED DIFF    Collection Time: 02/22/22 12:17 PM   Result Value Ref Range    WBC 10.4 4.1 - 11.1 K/uL    RBC 4.59 4. 10 - 5.70 M/uL    HGB 12.0 (L) 12.1 - 17.0 g/dL    HCT 39.8 36.6 - 50.3 %    MCV 86.7 80.0 - 99.0 FL    MCH 26.1 26.0 - 34.0 PG    MCHC 30.2 30.0 - 36.5 g/dL    RDW 14.4 11.5 - 14.5 %    PLATELET 709 005 - 012 K/uL    MPV 9.3 8.9 - 12.9 FL    NRBC 0.0 0  WBC    ABSOLUTE NRBC 0.00 0.00 - 0.01 K/uL    NEUTROPHILS 73 32 - 75 %    LYMPHOCYTES 19 12 - 49 %    MONOCYTES 7 5 - 13 %    EOSINOPHILS 1 0 - 7 %    BASOPHILS 0 0 - 1 %    IMMATURE GRANULOCYTES 0 0.0 - 0.5 %    ABS. NEUTROPHILS 7.6 1.8 - 8.0 K/UL    ABS. LYMPHOCYTES 1.9 0.8 - 3.5 K/UL    ABS. MONOCYTES 0.7 0.0 - 1.0 K/UL    ABS. EOSINOPHILS 0.1 0.0 - 0.4 K/UL    ABS. BASOPHILS 0.0 0.0 - 0.1 K/UL    ABS. IMM.  GRANS. 0.0 0.00 - 0.04 K/UL    DF AUTOMATED     METABOLIC PANEL, COMPREHENSIVE    Collection Time: 02/22/22 12:17 PM   Result Value Ref Range    Sodium 143 136 - 145 mmol/L    Potassium 3.3 (L) 3.5 - 5.1 mmol/L    Chloride 116 (H) 97 - 108 mmol/L    CO2 24 21 - 32 mmol/L Anion gap 3 (L) 5 - 15 mmol/L    Glucose 83 65 - 100 mg/dL    BUN 8 6 - 20 MG/DL    Creatinine 0.77 0.70 - 1.30 MG/DL    BUN/Creatinine ratio 10 (L) 12 - 20      GFR est AA >60 >60 ml/min/1.73m2    GFR est non-AA >60 >60 ml/min/1.73m2    Calcium 7.0 (L) 8.5 - 10.1 MG/DL    Bilirubin, total 0.3 0.2 - 1.0 MG/DL    ALT (SGPT) 24 12 - 78 U/L    AST (SGOT) 13 (L) 15 - 37 U/L    Alk. phosphatase 78 45 - 117 U/L    Protein, total 5.6 (L) 6.4 - 8.2 g/dL    Albumin 2.8 (L) 3.5 - 5.0 g/dL    Globulin 2.8 2.0 - 4.0 g/dL    A-G Ratio 1.0 (L) 1.1 - 2.2     NT-PRO BNP    Collection Time: 02/22/22 12:17 PM   Result Value Ref Range    NT pro-BNP 25 <125 PG/ML   TROPONIN-HIGH SENSITIVITY    Collection Time: 02/22/22 12:17 PM   Result Value Ref Range    Troponin-High Sensitivity <4 0 - 76 ng/L       Radiologic Studies -   XR CHEST PORT   Final Result   No acute process. CT Results  (Last 48 hours)    None        CXR Results  (Last 48 hours)               02/22/22 1207  XR CHEST PORT Final result    Impression:  No acute process. Narrative: Indication: Chest pain       Comparison: 12/30/2021       Portable exam of the chest obtained at 1156 demonstrates normal heart size. There is no acute process in the lung fields. The osseous structures are   unremarkable. Medical Decision Making   I am the first provider for this patient. I reviewed the vital signs, available nursing notes, past medical history, past surgical history, family history and social history. Vital Signs-Reviewed the patient's vital signs.   Patient Vitals for the past 12 hrs:   Temp Pulse Resp BP SpO2   02/22/22 1232 -- 70 16 136/84 100 %   02/22/22 1228 -- -- -- -- 100 %   02/22/22 1212 99.1 °F (37.3 °C) 70 20 135/84 100 %     ECG Interpretation: By my interpretation, normal sinus rhythm, ventricular rate in the 70s, normal axis, intervals wnl; possible t-wave inversion in aVL, but not definitive; no other ST elevation, depression, t-wave inversion to suggest acute ischemic insult    Records Reviewed: Nursing Notes and Old Medical Records    Provider Notes (Medical Decision Making):   DDx: The patient is hemodynamically stable, afebrile, and well-appearing. Differential for symptoms includes unstable angina (especially given response to nitroglycerin), but is perhaps less likely given unremarkable EKG, lack of typical risk factors (HEART score is 4). Low suspicion for CAP in absence of fevers, shortness of breath, hypoxia. The patient's troponin is wnl, ruling out NSTEMI. The patient required broad labs (notable for absence of significant leukocytosis, new anemia, stable renal function, wnl troponin, BNP), and likely cardiology evaluation prior to disposition. ED Course and Progress Notes:   Initial assessment performed. The patients presenting problems have been discussed, and they are in agreement with the care plan formulated and outlined with them. I have encouraged them to ask questions as they arise throughout their visit. ED Course as of 02/22/22 1459   Tue Feb 22, 2022   1458 Have spoken with Dr. Navneet Singleton; based on history, labs, EKG, he is comfortable that the patient can be followed up as an outpatient, which is the preferred follow-up plan for the patient as well; will discharge with strict return precautions [JL]      ED Course User Index  [JL] Quynh Byrd MD     Diagnosis     Clinical Impression:   1. Chest pain, unspecified type      Disposition:  Discharge    DISCHARGE PLAN:  1. There are no discharge medications for this patient.     2.   Follow-up Information     Follow up With Specialties Details Why Contact Info    Kiersten Herrera MD Internal Medicine   [address] [place]  [phone]      [facility]   They will call you with an appointment [address] [address]    [facility] EMERGENCY DEPT Emergency Medicine  If symptoms worsen [address]  [phone]        3. Return to ED if worse         Resident Signature: Josue Davis.  Mirella Perla MD

## 2022-03-08 ENCOUNTER — TELEPHONE (OUTPATIENT)
Dept: CARDIOLOGY CLINIC | Age: 57
End: 2022-03-08

## 2022-03-08 NOTE — TELEPHONE ENCOUNTER
I called and spoke with the patient, two identifiers verified. I have notified patient about this:  ----- Message from Lj Serrano NP sent at 3/8/2022  9:11 AM EST -----  Please call patient heart monitor is normal, no arrhythmias. Patient verbalized understanding. No other concerns at this time.

## 2022-03-18 PROBLEM — J09.X2 INFLUENZA A (H5N1): Status: ACTIVE | Noted: 2019-03-17

## 2022-03-19 PROBLEM — Z87.898 HISTORY OF SEIZURES: Status: ACTIVE | Noted: 2019-04-24

## 2022-03-19 PROBLEM — E66.9 OBESITY (BMI 35.0-39.9 WITHOUT COMORBIDITY): Status: ACTIVE | Noted: 2019-03-19

## 2022-03-19 PROBLEM — R55 SYNCOPE: Status: ACTIVE | Noted: 2019-03-16

## 2022-03-20 PROBLEM — G45.0 VERTEBRAL-BASILAR ARTERY OCCLUSIVE SYNDROME: Status: ACTIVE | Noted: 2019-04-24
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
## 2023-05-12 ENCOUNTER — APPOINTMENT (OUTPATIENT)
Facility: HOSPITAL | Age: 58
End: 2023-05-12

## 2023-05-12 ENCOUNTER — HOSPITAL ENCOUNTER (EMERGENCY)
Facility: HOSPITAL | Age: 58
Discharge: HOME | End: 2023-05-12
Attending: EMERGENCY MEDICINE

## 2023-05-12 VITALS
HEART RATE: 64 BPM | HEIGHT: 69 IN | OXYGEN SATURATION: 99 % | WEIGHT: 240 LBS | BODY MASS INDEX: 35.55 KG/M2 | RESPIRATION RATE: 18 BRPM | SYSTOLIC BLOOD PRESSURE: 117 MMHG | TEMPERATURE: 97.9 F | DIASTOLIC BLOOD PRESSURE: 78 MMHG

## 2023-05-12 DIAGNOSIS — R10.9 ABDOMINAL PAIN, VOMITING, AND DIARRHEA: Primary | ICD-10-CM

## 2023-05-12 DIAGNOSIS — R19.7 ABDOMINAL PAIN, VOMITING, AND DIARRHEA: Primary | ICD-10-CM

## 2023-05-12 DIAGNOSIS — R11.10 ABDOMINAL PAIN, VOMITING, AND DIARRHEA: Primary | ICD-10-CM

## 2023-05-12 LAB
ALBUMIN SERPL-MCNC: 3.6 G/DL (ref 3.5–5)
ALBUMIN/GLOB SERPL: 1 (ref 1.1–2.2)
ALP SERPL-CCNC: 100 U/L (ref 45–117)
ALT SERPL-CCNC: 38 U/L (ref 12–78)
AMPHET UR QL SCN: NEGATIVE
ANION GAP SERPL CALC-SCNC: 9 MMOL/L (ref 5–15)
APPEARANCE UR: CLEAR
AST SERPL-CCNC: 33 U/L (ref 15–37)
BACTERIA URNS QL MICRO: NEGATIVE /HPF
BARBITURATES UR QL SCN: NEGATIVE
BASOPHILS # BLD: 0 K/UL (ref 0–0.1)
BASOPHILS NFR BLD: 0 % (ref 0–1)
BENZODIAZ UR QL: NEGATIVE
BILIRUB SERPL-MCNC: 0.3 MG/DL (ref 0.2–1)
BILIRUB UR QL: NEGATIVE
BUN SERPL-MCNC: 12 MG/DL (ref 6–20)
BUN/CREAT SERPL: 10 (ref 12–20)
CALCIUM SERPL-MCNC: 8.4 MG/DL (ref 8.5–10.1)
CANNABINOIDS UR QL SCN: NEGATIVE
CHLORIDE SERPL-SCNC: 101 MMOL/L (ref 97–108)
CO2 SERPL-SCNC: 28 MMOL/L (ref 21–32)
COCAINE UR QL SCN: NEGATIVE
COLOR UR: ABNORMAL
CREAT SERPL-MCNC: 1.25 MG/DL (ref 0.7–1.3)
DIFFERENTIAL METHOD BLD: ABNORMAL
EOSINOPHIL # BLD: 0 K/UL (ref 0–0.4)
EOSINOPHIL NFR BLD: 0 % (ref 0–7)
EPITH CASTS URNS QL MICRO: ABNORMAL /LPF
ERYTHROCYTE [DISTWIDTH] IN BLOOD BY AUTOMATED COUNT: 13.9 % (ref 11.5–14.5)
ETHANOL SERPL-MCNC: <10 MG/DL (ref 0–0.08)
GLOBULIN SER CALC-MCNC: 3.7 G/DL (ref 2–4)
GLUCOSE SERPL-MCNC: 210 MG/DL (ref 65–100)
GLUCOSE UR STRIP.AUTO-MCNC: NEGATIVE MG/DL
HCT VFR BLD AUTO: 41.8 % (ref 36.6–50.3)
HGB BLD-MCNC: 12.4 G/DL (ref 12.1–17)
HGB UR QL STRIP: NEGATIVE
IMM GRANULOCYTES # BLD AUTO: 0 K/UL (ref 0–0.04)
IMM GRANULOCYTES NFR BLD AUTO: 0 % (ref 0–0.5)
KETONES UR QL STRIP.AUTO: NEGATIVE MG/DL
LACTATE SERPL-SCNC: 1.5 MMOL/L (ref 0.4–2)
LEUKOCYTE ESTERASE UR QL STRIP.AUTO: NEGATIVE
LYMPHOCYTES # BLD: 1.4 K/UL (ref 0.8–3.5)
LYMPHOCYTES NFR BLD: 20 % (ref 12–49)
Lab: NORMAL
MCH RBC QN AUTO: 25.9 PG (ref 26–34)
MCHC RBC AUTO-ENTMCNC: 29.7 G/DL (ref 30–36.5)
MCV RBC AUTO: 87.3 FL (ref 80–99)
METHADONE UR QL: NEGATIVE
MONOCYTES # BLD: 0.4 K/UL (ref 0–1)
MONOCYTES NFR BLD: 5 % (ref 5–13)
NEUTS SEG # BLD: 5.2 K/UL (ref 1.8–8)
NEUTS SEG NFR BLD: 75 % (ref 32–75)
NITRITE UR QL STRIP.AUTO: NEGATIVE
NRBC # BLD: 0 K/UL (ref 0–0.01)
NRBC BLD-RTO: 0 PER 100 WBC
OPIATES UR QL: NEGATIVE
PCP UR QL: NEGATIVE
PH UR STRIP: 7.5 (ref 5–8)
PLATELET # BLD AUTO: 203 K/UL (ref 150–400)
PMV BLD AUTO: 9.9 FL (ref 8.9–12.9)
POTASSIUM SERPL-SCNC: 3.9 MMOL/L (ref 3.5–5.1)
PROT SERPL-MCNC: 7.3 G/DL (ref 6.4–8.2)
PROT UR STRIP-MCNC: NEGATIVE MG/DL
RBC # BLD AUTO: 4.79 M/UL (ref 4.1–5.7)
RBC #/AREA URNS HPF: ABNORMAL /HPF (ref 0–5)
SODIUM SERPL-SCNC: 138 MMOL/L (ref 136–145)
SP GR UR REFRACTOMETRY: >1.03 (ref 1–1.03)
UROBILINOGEN UR QL STRIP.AUTO: 1 EU/DL (ref 0.2–1)
WBC # BLD AUTO: 7.1 K/UL (ref 4.1–11.1)
WBC URNS QL MICRO: ABNORMAL /HPF (ref 0–4)

## 2023-05-12 PROCEDURE — 80053 COMPREHEN METABOLIC PANEL: CPT

## 2023-05-12 PROCEDURE — C9113 INJ PANTOPRAZOLE SODIUM, VIA: HCPCS | Performed by: EMERGENCY MEDICINE

## 2023-05-12 PROCEDURE — 6360000004 HC RX CONTRAST MEDICATION: Performed by: EMERGENCY MEDICINE

## 2023-05-12 PROCEDURE — 2580000003 HC RX 258: Performed by: EMERGENCY MEDICINE

## 2023-05-12 PROCEDURE — 85025 COMPLETE CBC W/AUTO DIFF WBC: CPT

## 2023-05-12 PROCEDURE — A4216 STERILE WATER/SALINE, 10 ML: HCPCS | Performed by: EMERGENCY MEDICINE

## 2023-05-12 PROCEDURE — 96375 TX/PRO/DX INJ NEW DRUG ADDON: CPT

## 2023-05-12 PROCEDURE — 6370000000 HC RX 637 (ALT 250 FOR IP): Performed by: EMERGENCY MEDICINE

## 2023-05-12 PROCEDURE — 83605 ASSAY OF LACTIC ACID: CPT

## 2023-05-12 PROCEDURE — 99285 EMERGENCY DEPT VISIT HI MDM: CPT

## 2023-05-12 PROCEDURE — 80307 DRUG TEST PRSMV CHEM ANLYZR: CPT

## 2023-05-12 PROCEDURE — 81001 URINALYSIS AUTO W/SCOPE: CPT

## 2023-05-12 PROCEDURE — 96374 THER/PROPH/DIAG INJ IV PUSH: CPT

## 2023-05-12 PROCEDURE — 74177 CT ABD & PELVIS W/CONTRAST: CPT

## 2023-05-12 PROCEDURE — 82077 ASSAY SPEC XCP UR&BREATH IA: CPT

## 2023-05-12 PROCEDURE — 6360000002 HC RX W HCPCS: Performed by: EMERGENCY MEDICINE

## 2023-05-12 RX ORDER — DROPERIDOL 2.5 MG/ML
1.25 INJECTION, SOLUTION INTRAMUSCULAR; INTRAVENOUS EVERY 6 HOURS PRN
Status: DISCONTINUED | OUTPATIENT
Start: 2023-05-12 | End: 2023-05-12 | Stop reason: HOSPADM

## 2023-05-12 RX ORDER — ONDANSETRON 2 MG/ML
4 INJECTION INTRAMUSCULAR; INTRAVENOUS EVERY 6 HOURS PRN
Status: DISCONTINUED | OUTPATIENT
Start: 2023-05-12 | End: 2023-05-12 | Stop reason: HOSPADM

## 2023-05-12 RX ORDER — ONDANSETRON 4 MG/1
4 TABLET, ORALLY DISINTEGRATING ORAL 3 TIMES DAILY PRN
Qty: 21 TABLET | Refills: 0 | Status: SHIPPED | OUTPATIENT
Start: 2023-05-12

## 2023-05-12 RX ORDER — 0.9 % SODIUM CHLORIDE 0.9 %
1000 INTRAVENOUS SOLUTION INTRAVENOUS ONCE
Status: COMPLETED | OUTPATIENT
Start: 2023-05-12 | End: 2023-05-12

## 2023-05-12 RX ORDER — DICYCLOMINE HCL 20 MG
20 TABLET ORAL EVERY 6 HOURS
Qty: 120 TABLET | Refills: 3 | Status: SHIPPED | OUTPATIENT
Start: 2023-05-12

## 2023-05-12 RX ORDER — MORPHINE SULFATE 4 MG/ML
4 INJECTION, SOLUTION INTRAMUSCULAR; INTRAVENOUS
Status: COMPLETED | OUTPATIENT
Start: 2023-05-12 | End: 2023-05-12

## 2023-05-12 RX ADMIN — IOPAMIDOL 100 ML: 755 INJECTION, SOLUTION INTRAVENOUS at 02:25

## 2023-05-12 RX ADMIN — PANTOPRAZOLE SODIUM 40 MG: 40 INJECTION, POWDER, LYOPHILIZED, FOR SOLUTION INTRAVENOUS at 03:35

## 2023-05-12 RX ADMIN — SODIUM CHLORIDE 1000 ML: 9 INJECTION, SOLUTION INTRAVENOUS at 01:09

## 2023-05-12 RX ADMIN — Medication 40 ML: at 03:34

## 2023-05-12 RX ADMIN — MORPHINE SULFATE 4 MG: 4 INJECTION, SOLUTION INTRAMUSCULAR; INTRAVENOUS at 04:41

## 2023-05-12 ASSESSMENT — PAIN - FUNCTIONAL ASSESSMENT: PAIN_FUNCTIONAL_ASSESSMENT: 0-10

## 2023-05-12 ASSESSMENT — PAIN DESCRIPTION - LOCATION: LOCATION: ABDOMEN

## 2023-05-12 ASSESSMENT — PAIN DESCRIPTION - ORIENTATION: ORIENTATION: MID

## 2023-05-12 ASSESSMENT — PAIN SCALES - GENERAL: PAINLEVEL_OUTOF10: 10

## 2023-05-12 ASSESSMENT — PAIN DESCRIPTION - DESCRIPTORS: DESCRIPTORS: ACHING

## 2023-05-12 NOTE — ED PROVIDER NOTES
[facility] EMERGENCY DEPT  EMERGENCY DEPARTMENT ENCOUNTER       Pt Name: Ethan Irving  MRN: [id number]  [place] 1965  Date of evaluation: 5/12/2023  Provider: Colt Esparza MD   PCP: None None  Note Started: 6:36 AM 5/12/23     CHIEF COMPLAINT       Chief Complaint   Patient presents with    Abdominal Pain        HISTORY OF PRESENT ILLNESS: 1 or more elements      History From: patient, History limited by:  none     Ethan Irving is a 61 y.o. male who presents EMS with sudden onset epigastric pain, vomiting, diarrhea which began around 5 PM after eating [employer]. Denies prior abdominal surgery or history of same. Denies back pain, chest pain, urinary symptoms. Nursing Notes were all reviewed and agreed with or any disagreements were addressed in the HPI. REVIEW OF SYSTEMS        Positives and Pertinent negatives as per HPI. PAST HISTORY     Past Medical History:  Past Medical History:   Diagnosis Date    Seizures ([address]     seizure as a child    Syncope        Past Surgical History:  Past Surgical History:   Procedure Laterality Date    OTHER SURGICAL HISTORY      cyst removed from right shoulder       Family History:  Family History   Problem Relation Age of Onset    Heart Disease Brother        Social History:  Social History     Tobacco Use    Smoking status: Some Days    Smokeless tobacco: Never   Substance Use Topics    Alcohol use: Yes    Drug use: No       Allergies:  No Known Allergies    CURRENT MEDICATIONS      Discharge Medication List as of 5/12/2023  5:56 AM          SCREENINGS               No data recorded         PHYSICAL EXAM      ED Triage Vitals [05/12/23 0008]   Enc Vitals Group      BP (!) 112/52      Pulse 64      Respirations 18      Temp 97.9 °F (36.6 °C)      Temp Source Oral      SpO2 99 %      Weight - Scale 240 lb (108.9 kg)      Height 5' 9\" (1.753 m)      Head Circumference       Peak Flow       Pain Score       Pain Loc       Pain Edu? Excl.  in [address]?

## 2023-05-12 NOTE — ED NOTES
Patient discharged by provider, discharge instructions provided to patient and reviewed, all questions answered.  Patient ambulatory to [facility] to await for ride home     Galileo Syed, [address] [facility]  05/12/23 1672

## 2025-07-26 ENCOUNTER — HOSPITAL ENCOUNTER (EMERGENCY)
Facility: HOSPITAL | Age: 60
Discharge: HOME | End: 2025-07-26
Payer: COMMERCIAL

## 2025-07-26 VITALS
HEIGHT: 69 IN | TEMPERATURE: 98.1 F | RESPIRATION RATE: 18 BRPM | DIASTOLIC BLOOD PRESSURE: 85 MMHG | OXYGEN SATURATION: 100 % | BODY MASS INDEX: 35.55 KG/M2 | HEART RATE: 90 BPM | SYSTOLIC BLOOD PRESSURE: 154 MMHG | WEIGHT: 240 LBS

## 2025-07-26 DIAGNOSIS — B35.1 ONYCHOMYCOSIS: ICD-10-CM

## 2025-07-26 DIAGNOSIS — L60.8 TOENAIL DEFORMITY: Primary | ICD-10-CM

## 2025-07-26 DIAGNOSIS — R03.0 ELEVATED BLOOD PRESSURE READING: ICD-10-CM

## 2025-07-26 PROCEDURE — 99283 EMERGENCY DEPT VISIT LOW MDM: CPT

## 2025-07-26 RX ORDER — CLOTRIMAZOLE 1 %
CREAM (GRAM) TOPICAL
Qty: 14 G | Refills: 0 | Status: SHIPPED | OUTPATIENT
Start: 2025-07-26 | End: 2025-08-02

## 2025-07-26 ASSESSMENT — LIFESTYLE VARIABLES
HOW MANY STANDARD DRINKS CONTAINING ALCOHOL DO YOU HAVE ON A TYPICAL DAY: PATIENT DOES NOT DRINK
HOW OFTEN DO YOU HAVE A DRINK CONTAINING ALCOHOL: NEVER

## 2025-07-26 ASSESSMENT — PAIN SCALES - GENERAL: PAINLEVEL_OUTOF10: 8

## 2025-07-26 ASSESSMENT — PAIN - FUNCTIONAL ASSESSMENT: PAIN_FUNCTIONAL_ASSESSMENT: 0-10

## 2025-07-26 NOTE — DISCHARGE INSTRUCTIONS
Thank You!    It was a pleasure taking care of you in our Emergency Department today. We know that when you come to [facility], you are entrusting us with your health, comfort, and safety. Our clinicians honor that trust, and truly appreciate the opportunity to care for you and your loved ones.    If you receive a survey about your Emergency Department experience today, please fill it out.  We value your feedback. Thank you.      Pam Diego PA-C    ___________________________________  I have included a copy of your lab results and/or radiologic studies from today's visit so you can have them easily available at your follow-up visit.   No results found for this or any previous visit (from the past 12 hours).    No orders to display     [unfilled]

## 2025-07-26 NOTE — ED PROVIDER NOTES
[facility] EMERGENCY DEPARTMENT  EMERGENCY DEPARTMENT ENCOUNTER       Pt Name: Reed Valencia  MRN: [id number]  Birthdate 1965  Date of evaluation: 7/26/2025  Provider: ETHAN Matta   PCP: None, None  Note Started: 7:25 AM EDT 7/26/25     CHIEF COMPLAINT       Chief Complaint   Patient presents with    Toe Pain     Pt ambulatory into TR room. Pt c/o R big toe nail bed pain. Pt states he has a hx of ingrown toe nail and states nail bed came out last year and has grown back . No injury , drainage, or fevers        HISTORY OF PRESENT ILLNESS: 1 or more elements      History From: Patient  None     Reed Valencia is a 59 y.o. male with history as noted below, who presents to the ED for evaluation of left great toenail complaints. States he had it removed by podiatry 2 years ago. Denies any trauma or injuries. Denies any surrounding swelling, redness or warmth.  Only endorses pain when the toe rubs against the other toes.  Denies fevers. States he is otherwise in his usual state of health.      Nursing Notes were all reviewed and agreed with or any disagreements were addressed in the HPI.     REVIEW OF SYSTEMS      Review of Systems   All other systems reviewed and are negative.       Positives and Pertinent negatives as per HPI.    PAST HISTORY     Past Medical History:  Past Medical History:   Diagnosis Date    Seizures (HCC)     seizure as a child    Syncope        Past Surgical History:  Past Surgical History:   Procedure Laterality Date    OTHER SURGICAL HISTORY      cyst removed from right shoulder       Family History:  Family History   Problem Relation Age of Onset    Heart Disease Brother        Social History:  Social History     Tobacco Use    Smoking status: Some Days    Smokeless tobacco: Never   Substance Use Topics    Alcohol use: Yes    Drug use: No       Allergies:  No Known Allergies        PHYSICAL EXAM      ED Triage Vitals [07/26/25 0714]   Encounter Vitals Group      BP (!)  follow-up.  Verbal return precautions advised.           No evidence of emergent conditions requiring further evaluation or management acutely here at this time.       FINAL IMPRESSION     1. Toenail deformity    2. Onychomycosis    3. Elevated blood pressure reading          DISPOSITION/PLAN   DISPOSITION Decision To Discharge 07/26/2025 07:43:02 AM   DISPOSITION CONDITION Stable           Discharge Note: The patient is stable for discharge home. The signs, symptoms, diagnosis, and discharge instructions have been discussed, understanding conveyed, and agreed upon. The patient is to follow up as recommended or return to ER should their symptoms worsen.      PATIENT REFERRED TO:  [facility] Emergency Department  [address]  [facility] [address]  [phone]    As needed    your primary care provider  follow-up with your PCP for re-evaluation and follow-up from [facility] ED visit    monitor blood pressures and follow-up with PCP given elevated blood pressure reading here in the ED, also advised follow-up with PCP for reevaluation from [facility] ED visit    Betty Carlisle DPM  [address] [place] [address] [address]  [facility] [id number] [phone]    In 1 week  As needed, If symptoms worsen    Janak Etienne DPM  [address] [facility] [id number] [phone]          [facility]  [address] [facility]  [address]  [facility] [address]            DISCHARGE MEDICATIONS:     Medication List        START taking these medications      clotrimazole 1 % cream  Commonly known as: Clotrimazole Anti-Fungal  Apply topically to area 2 times daily.            ASK your doctor about these medications      dicyclomine 20 MG tablet  Commonly known as: BENTYL  Take 1 tablet by mouth in the morning and 1 tablet at noon and 1 tablet in the evening and 1 tablet before bedtime.     ondansetron 4 MG disintegrating tablet  Commonly known as: ZOFRAN-ODT  Take 1 tablet by mouth 3 times daily